# Patient Record
Sex: FEMALE | Race: WHITE | HISPANIC OR LATINO | URBAN - METROPOLITAN AREA
[De-identification: names, ages, dates, MRNs, and addresses within clinical notes are randomized per-mention and may not be internally consistent; named-entity substitution may affect disease eponyms.]

---

## 2017-11-02 PROBLEM — S42.231A CLOSED 3-PART FRACTURE OF SURGICAL NECK OF RIGHT HUMERUS: Status: ACTIVE | Noted: 2017-11-02

## 2017-11-02 PROBLEM — D72.829 LEUKOCYTOSIS: Status: ACTIVE | Noted: 2017-11-02

## 2017-11-02 PROBLEM — Z79.891 CHRONIC PRESCRIPTION OPIATE USE: Status: ACTIVE | Noted: 2017-11-02

## 2017-11-02 PROBLEM — M81.0 OSTEOPOROSIS: Status: ACTIVE | Noted: 2017-11-02

## 2017-11-02 PROBLEM — M19.90 OSTEOARTHRITIS: Status: ACTIVE | Noted: 2017-11-02

## 2017-11-02 PROBLEM — G89.4 CHRONIC PAIN SYNDROME: Status: ACTIVE | Noted: 2017-11-02

## 2017-11-02 PROBLEM — E87.1 HYPONATREMIA: Status: ACTIVE | Noted: 2017-11-02

## 2017-11-10 DIAGNOSIS — Z98.890 S/P ORIF (OPEN REDUCTION INTERNAL FIXATION) FRACTURE: Primary | ICD-10-CM

## 2017-11-10 DIAGNOSIS — Z87.81 S/P ORIF (OPEN REDUCTION INTERNAL FIXATION) FRACTURE: Primary | ICD-10-CM

## 2017-11-13 ENCOUNTER — HOSPITAL ENCOUNTER (OUTPATIENT)
Dept: RADIOLOGY | Facility: HOSPITAL | Age: 62
Discharge: HOME | End: 2017-11-13
Attending: ORTHOPAEDIC SURGERY
Payer: MEDICAID

## 2017-11-13 ENCOUNTER — OFFICE VISIT (OUTPATIENT)
Dept: ORTHOPEDICS | Facility: CLINIC | Age: 62
End: 2017-11-13
Payer: MEDICAID

## 2017-11-13 VITALS — BODY MASS INDEX: 22.84 KG/M2 | HEIGHT: 61 IN | WEIGHT: 121 LBS

## 2017-11-13 DIAGNOSIS — Z87.81 S/P ORIF (OPEN REDUCTION INTERNAL FIXATION) FRACTURE: ICD-10-CM

## 2017-11-13 DIAGNOSIS — Z98.890 S/P ORIF (OPEN REDUCTION INTERNAL FIXATION) FRACTURE: ICD-10-CM

## 2017-11-13 DIAGNOSIS — Z98.890 S/P ORIF (OPEN REDUCTION INTERNAL FIXATION) FRACTURE: Primary | ICD-10-CM

## 2017-11-13 DIAGNOSIS — S42.292D CLOSED 3-PART FRACTURE OF PROXIMAL HUMERUS, LEFT, WITH ROUTINE HEALING, SUBSEQUENT ENCOUNTER: ICD-10-CM

## 2017-11-13 DIAGNOSIS — Z87.81 S/P ORIF (OPEN REDUCTION INTERNAL FIXATION) FRACTURE: Primary | ICD-10-CM

## 2017-11-13 PROCEDURE — 99999 PR PBB SHADOW E&M-EST. PATIENT-LVL III: CPT | Mod: PBBFAC,,, | Performed by: ORTHOPAEDIC SURGERY

## 2017-11-13 PROCEDURE — 99213 OFFICE O/P EST LOW 20 MIN: CPT | Mod: PBBFAC,25,PO | Performed by: ORTHOPAEDIC SURGERY

## 2017-11-13 PROCEDURE — 73030 X-RAY EXAM OF SHOULDER: CPT | Mod: TC,PO,RT

## 2017-11-13 PROCEDURE — 99024 POSTOP FOLLOW-UP VISIT: CPT | Mod: ,,, | Performed by: ORTHOPAEDIC SURGERY

## 2017-11-13 PROCEDURE — 73030 X-RAY EXAM OF SHOULDER: CPT | Mod: 26,RT,, | Performed by: RADIOLOGY

## 2017-11-14 NOTE — PROGRESS NOTES
"DATE: 11/13/2017  PATIENT: Shari Carpenter    Attending Physician: Gregory Villaseñor M.D.    HISTORY:  Shari Carpenter is a 62 y.o. female who returns for follow up evaluation of  her right proximal humerus fracture.  She underwent ORIF, 11/3/17.  Reports that she is weaning out of the sling.  Pain is reported at 4/10.  She denies any fevers or chills.    PMH/PSH/FamHx/SocHx:  Reviewed and unchanged from prior visit    ROS:  Constitution: Negative for chills, fever, and sweats. Negative for unexplained weight loss.  HENT: Negative for headaches and blurry vision.   Cardiovascular: Negative for chest pain, irregular heartbeat, leg swelling and palpitations.   Respiratory: Negative for cough and shortness of breath.   Gastrointestinal: Negative for abdominal pain, heartburn, nausea and vomiting.   Genitourinary: Negative for bladder incontinence and dysuria.   Musculoskeletal: Negative for systemic arthritis, joint swelling, muscle weakness and myalgias.   Neurological: Negative for numbness.   Psychiatric/Behavioral: Negative for depression.   Endocrine: Negative for polyuria.   Hematologic/Lymphatic: Negative for bleeding disorders.  Skin: Negative for poor wound healing.       EXAM:  Ht 5' 1" (1.549 m)   Wt 54.9 kg (121 lb)   LMP  (Exact Date)   BMI 22.86 kg/m²   Healthy-appearing female no acute distress.  Examination of the right shoulder reveals the incision is clean and dry.  Sensation is intact the lateral upper arm and lateral forearm.  range of motion is 70° of forward elevation and 60° of abduction.  Motor strength not tested.  Sensory motor exam intact to the radial, ulnar, median nerve distribution of the hand    IMAGING:   X-rays the right shoulder performed and personally reviewed and compared to the radiologist's report.  Status post ORIF right proximal humerus fracture.  Fracture alignment is satisfactory.  Hardware is in acceptable position.    ASSESSMENT:  Status post ORIF right proximal humerus " fracture, 11/3/17    PLAN:  The implications of the patient's evolution of symptoms and findings were explained to the patient and her family in detail.  And it is doing well postoperatively.  Sutures are removed today.  We'll start formal therapy for range of motion and gentle strengthening.  Follow-up in 4 weeks' time for reexam and follow-up x-rays of the right shoulder to include AP in internal and external rotation views.          This note was dictated using voice recognition software. Please excuse any grammatical or typographical errors.

## 2017-11-22 ENCOUNTER — CLINICAL SUPPORT (OUTPATIENT)
Dept: REHABILITATION | Facility: HOSPITAL | Age: 62
End: 2017-11-22
Payer: MEDICAID

## 2017-11-22 DIAGNOSIS — M25.60 STIFFNESS IN JOINT: ICD-10-CM

## 2017-11-22 DIAGNOSIS — M79.601 PAIN OF RIGHT UPPER EXTREMITY: ICD-10-CM

## 2017-11-22 DIAGNOSIS — R53.1 WEAKNESS: ICD-10-CM

## 2017-11-22 PROCEDURE — 97165 OT EVAL LOW COMPLEX 30 MIN: CPT | Mod: PN

## 2017-11-22 NOTE — PLAN OF CARE
TIME RECORD    Date: 11/22/2017    Start Time:  100  Stop Time:  145    PROCEDURES:    TIMED  Procedure Min.   TE -             UNTIMED  Procedure Min.   IE 45         Total Timed Minutes:  -  Total Timed Units:  -  Total Untimed Units:  1  Charges Billed/# of units:  LCE1    Visit:1 FOTO @ 5    OCCUPATIONAL THERAPY INITIAL EVALUATION & PLAN OF TREATMENT    Patient Name: Shari Carpenter  Physician Name:  Gregory Villaseñor  Primary Diagnosis:  R 3 part fracture s/p ORIF  Treatment Diagnosis:  Pain in limb, weakness, stiffness in joint  Onset Date:  DOS:11/03/17  Eval Date:  11/22/2017  Certification Period:  11/22/2017 to 1/22/18  Past Medical History:   Past Medical History:   Diagnosis Date    Migraine headache      Precautions:  Per protocol, NWB  Prior Therapy:  None for shoulder  Signs of Abuse: no  Medications: Shari Carpenter has a current medication list which includes the following prescription(s): oxycodone-acetaminophen and sumatriptan.  Nutrition:  WDWNF  Social Cultural Assessment:  Doesn't work  Prior Level of Function: Independent  Social History:  Lives alone, drives  Place of Residence (steps/adaptations):  N/A  Functional Deficits Leading to Referral/Nature of Injury:  Was tackled by sister's dog resulting in fracture requiring surgery. Presents today without sling. Pertinent findings are decreased ROM, weakness and pain all impacting pt's functional independence.   Patient Therapy Goals:  To decrease  Pain and increase functional use RUE  Hand dominance: Right  X-Rays/Tests: see operative report    Subjective:  Pain:  During no work: 6/10  While working: 10/10  Sleeping: 10/10  Location of pain: lateral shoulder    Objective:  Sensation Test: Patient denies any numbness/tingling    Observation/Inspection:rounded shoulders    Range of Motion:   Shoulder  Right   Left  Pain/Dysfunction with Movement    AROM PROM MMT AROM PROM MMT    flexion 120 145 N/T WNL WNL WNL    extension 40 WNL N/T WNL WNL WNL     abduction 105 130 N/T WNL WNL WNL    adduction 0 N/T N/T WNL WNL WNL    Internal rotation L4 50 N/T WNL WNL WNL    ER at 90° abd 45 35 N/T WNL WNL WNL    ER at 0° abd 50 45 N/T WNL WNL WNL      ROM Comments: Firm end feel, Pain at end range    Painful Arc: Patient demonstrates no painful arc in shoulder flexion or abduction    Special Tests:deferred    Palpation: (for pain)     Positive: biceps, upper traps, incision-dense scar tissue, hypersensitive    Limitations of Functional Status:   Self Care: requires increase time to don clothes, inability or pain with donning bra and reaching overhead, brushing hair  Work: N/A  Leisure: cooking, cleaning, mopping, sweeping, carrying, lifting, reaching    Test: Patient scored 59% on FOTO shoulder survey demonstrating Pt's functional ability with upper extremity.     Treatment included: OT evaluation, the following exercises (HEP) were instructed and Shari CARLSON was able to demonstrate them prior to the end of the session. HEP are as follows: see attached in media     Assessment  This 62 y.o. female referred to Outpatient Occupational Therapy with diagnosis of R 3 part fracture s/p ORIF presents with limitations as described in problem list. Patient can benefit from Occupational Therapy services for Ultrasound, moist heat, PROM, AAROM, AROM, Theraputic exercises, joint mobs, home exercise program provied with written instructions, ice and strengthening. The following goals were discussed with the patient and she is in agreement with them as to be addressed in the treatment plan.     History Examination Decision Making Complexity Score   Occupational Profile: Did an expanded chart review        Medical and Therapy History:     Comorbidities that may affect POC include: none noted          Low   Performance Deficits   Dominant UE affected    Physical  Decreased function of Right UE, Decreased ROM, Increased pain, Decreased strength, Hypomobility, Muscular atrophy, Inability to  perform work/tasks, Difficulty sleeping, Inability to perform leisure activiites and Inability to perform self care tasks    Cognitive  N/A      Psychosocial:    Pt unable to perform the following requires increase time to don clothes, inability or pain with donning bra and reaching overhead, brushing hair, cooking, cleaning, mopping, sweeping, carrying, lifting, reaching all of which were a part of pt's habits, roles, routines, interpersonal interactions prior to injury/surgey     Moderate Foto score:59%    Pt has several treatment options including ASTYM, IASTM, cupping, soft tissue mobs, joint mobs, therex, therapeutic activity, education, endurance training, modalities etc.      Discussed goals and Pt in agreement with goals.    Issued HEP at eval and pt able to perform all with minimal verbal and tactile cues provided by OT. Pt able to complete all without c/o and demonstrating good technique    Low Low     Rehab Potential: good    Goals to be met in 4 weeks: (12/22/17)  1) Initiate Hep   2) Pt will increase R shoulder PROM by 10 degrees grossly for improved performance with overhead ADL's  3) Pt will report 6/10 pain in (R)shoulder at worst  4) Pt will progress to AROM/strengthening therex  5) Patient will be able to achieve less than or equal to 35% on FOTO shoulder survey demonstrating overall improved functional ability with upper extremity.     Goals to be met by discharge:  1) Independent with HEP  2) Pt will demonstrate (R) shoulder AROM WFL grossly for [place] with ADL's  3) Pt will demonstrate (R) shoulder MMT WFL grossly for [place] with functional activities  4) Independent and pain free with ADL's and IADL's  5) Patient will be able to achieve less than or equal to 15% on FOTO shoulder survey demonstrating overall improved functional ability with upper extremity.     Plan  Recommended Treatment Plan (2 times per week for 8 weeks): Therapeutic Exercise, Functional Activities, Patient  Education, Home Exercise Program, Ultrasound/Phonophoresis, Edema Control, Electrical Stimulation/TENS/Interferential, Moist Heat/Ice, Sensory/Neuromuscular Reeducation and Manual Therapy  Other Recommendations:  FEMI/ASTYM PRN    Therapist's Name: Maritza Bryanna   Date: 11/22/2017    I CERTIFY THE NEED FOR THESE SERVICES FURNISHED UNDER THIS PLAN OF TREATMENT AND WHILE UNDER MY CARE    Physician's comments: ________________________________________________________________________________________________________________________________________________      Physician's Name: ___________________________________

## 2017-11-29 ENCOUNTER — CLINICAL SUPPORT (OUTPATIENT)
Dept: REHABILITATION | Facility: HOSPITAL | Age: 62
End: 2017-11-29
Payer: MEDICAID

## 2017-11-29 DIAGNOSIS — R53.1 WEAKNESS: ICD-10-CM

## 2017-11-29 DIAGNOSIS — M79.601 PAIN OF RIGHT UPPER EXTREMITY: ICD-10-CM

## 2017-11-29 DIAGNOSIS — M25.60 STIFFNESS IN JOINT: ICD-10-CM

## 2017-11-29 PROCEDURE — 97530 THERAPEUTIC ACTIVITIES: CPT | Mod: PN

## 2017-11-29 NOTE — PROGRESS NOTES
"TIME RECORD    Date:  11/29/2017    Start Time:  1210  Stop Time:  100    PROCEDURES:    TIMED  Procedure Min.   MT 15   TE 10   TE Supervised 15 NC             UNTIMED  Procedure Min.   MHP 10 NC         Total Timed Minutes:  25  Total Timed Units:  2  Total Untimed Units: 0  Charges Billed/# of units:  2  TAx2    Progress/Current Status    Subjective:     Patient ID: Shari Carpenter is a 62 y.o. female.  Diagnosis:   1. Pain of right upper extremity     2. Weakness     3. Stiffness in joint     Physician Name:  Gregory Villaseñor  Primary Diagnosis:  R 3 part fracture s/p ORIF  Treatment Diagnosis:  Pain in limb, weakness, stiffness in joint  Onset Date:  DOS:11/03/17  Eval Date:  11/22/2017  Certification Period:  11/22/2017 to 1/22/18  Precautions: NWB      Pain: 5 /10  "Sometimes it hurts all the way into my neck."    Objective:     Pt seen by OT for R 3 part fx, s/p ORIF 3 weeks, 5 days p/o with tx as follows:  MHP x 10 min to R shld for pain mgmt and tissue extensibility.  MT x 15 min consisting of patient supine for R shoulder lateral telescoping, UT STM/TPR, STM to upper arm, pec lift, subscapularis MFR and stretch, gentle PROM to end range, GHJ inferior anterior posterior glides grade I-III, gentle shoulder oscillations and scar mgmt.   Pt participates in OT therex for ROM R shoulder per tx log adhering to post op protocol.   Exercises Date 11/29/17     Visit #2   PROM (R) Shoulder FLEX/ABD/IR/ER 10x   Isometrics f/abd/add/ext/er/ir 10 reps ea with 5 second hold   Upper traps stretch 3/30" hold   scap retraction 30x   Shoulder shrugs 30x   Scap prot/retr 30x   pendulums 30x each way            Declined CP at end of session.    Assessment:     Pt with good participation, motivated to work with therapy.  She tolerated MT and PROM well with moderate tightness/tenderness throughout upper arm, upper traps and shoulder girdle. Minimal pain at end ranges.  She has moderately dense scar tissue at scar site as well.  " She reports compliance with HEP and scar mgmt.  Pt would benefit from continued OT to decrease pain, increase ROM, strength and functional use of RUE, progressing as able.     Patient Education/Response:     Cont HEP    Plans and Goals:     Cont OT 2 times a week for 8 weeks.     Goals to be met in 4 weeks: (12/22/17)  1) Initiate Hep   2) Pt will increase R shoulder PROM by 10 degrees grossly for improved performance with overhead ADL's  3) Pt will report 6/10 pain in (R)shoulder at worst  4) Pt will progress to AROM/strengthening therex  5) Patient will be able to achieve less than or equal to 35% on FOTO shoulder survey demonstrating overall improved functional ability with upper extremity.      Goals to be met by discharge:  1) Independent with HEP  2) Pt will demonstrate (R) shoulder AROM WFL grossly for [place] with ADL's  3) Pt will demonstrate (R) shoulder MMT WFL grossly for [place] with functional activities  4) Independent and pain free with ADL's and IADL's  5) Patient will be able to achieve less than or equal to 15% on FOTO shoulder survey demonstrating overall improved functional ability with upper extremity.

## 2017-12-06 ENCOUNTER — CLINICAL SUPPORT (OUTPATIENT)
Dept: REHABILITATION | Facility: HOSPITAL | Age: 62
End: 2017-12-06
Payer: MEDICAID

## 2017-12-06 DIAGNOSIS — R53.1 WEAKNESS: ICD-10-CM

## 2017-12-06 DIAGNOSIS — M25.60 STIFFNESS IN JOINT: ICD-10-CM

## 2017-12-06 DIAGNOSIS — M79.601 PAIN OF RIGHT UPPER EXTREMITY: ICD-10-CM

## 2017-12-06 PROCEDURE — 97530 THERAPEUTIC ACTIVITIES: CPT | Mod: PN

## 2017-12-06 NOTE — PROGRESS NOTES
"TIME RECORD    Date:  12/06/2017    Start Time:  300  Stop Time:  350    PROCEDURES:    TIMED  Procedure Min.   MT 15   TE 25                 UNTIMED  Procedure Min.   MHP 10 NC         Total Timed Minutes:  40  Total Timed Units:  3  Total Untimed Units: 0  Charges Billed/# of units:  3  TAx3    Progress/Current Status    Subjective:     Patient ID: Shari Carpenter is a 62 y.o. female.  Diagnosis:   1. Pain of right upper extremity     2. Weakness     3. Stiffness in joint     Physician Name:  Gregory Villaseñor  Primary Diagnosis:  R 3 part fracture s/p ORIF  Treatment Diagnosis:  Pain in limb, weakness, stiffness in joint  Onset Date:  DOS:11/03/17  Eval Date:  11/22/2017  Certification Period:  11/22/2017 to 1/22/18  Precautions: NWB    Pain: 6-7/10  "I go see the doctor next Monday. I slept on my shoulder wrong and it's been hurting since then."    Objective:     Pt seen by OT for R 3 part fx, s/p ORIF 4 weeks, 5 days p/o with tx as follows:  MHP x 10 min to R shld for pain mgmt and tissue extensibility.  MT x 15 min consisting of patient supine for R shoulder lateral telescoping, UT STM/TPR, STM to upper arm, pec lift, subscapularis MFR and stretch, gentle PROM to end range, GHJ inferior anterior posterior glides grade I-III, gentle shoulder oscillations and scar mgmt.   Pt participates in OT therex for ROM R shoulder per tx log adhering to post op protocol.   Exercises Date 11/29/17     Visit #3   PROM (R) Shoulder FLEX/ABD/IR/ER 10x   Isometrics f/abd/add/ext/er/ir 10 reps ea with 5 second hold   Upper traps stretch 3/30" hold   scap retraction 30x   Shoulder shrugs 30x   Scap prot/retr 30x   pendulums 30x each way   Supine dowel shoulder flex to 90 2/10   -ER 2/10   pulleys Facing wall 3min   Table slides 3min      Declined CP at end of session.    Assessment:     Pt with increased pain this date. Reinforced pain free with all HEP and activity. Also reinforced no lifting. Pt verbalized understanding. She " tolerated MT and PROM well with moderate tightness/tenderness throughout bicep, upper traps and noted dense scar tissue at incsion. Minimal pain at end ranges. ROM seems to be steadily improving. Pt would benefit from continued OT to decrease pain, increase ROM, strength and functional use of RUE, progressing as able.     Patient Education/Response:     Cont HEP    Plans and Goals:     Cont OT 2 times a week for 8 weeks.     Goals to be met in 4 weeks: (12/22/17)  1) Initiate Hep   2) Pt will increase R shoulder PROM by 10 degrees grossly for improved performance with overhead ADL's  3) Pt will report 6/10 pain in (R)shoulder at worst  4) Pt will progress to AROM/strengthening therex  5) Patient will be able to achieve less than or equal to 35% on FOTO shoulder survey demonstrating overall improved functional ability with upper extremity.      Goals to be met by discharge:  1) Independent with HEP  2) Pt will demonstrate (R) shoulder AROM WFL grossly for [place] with ADL's  3) Pt will demonstrate (R) shoulder MMT WFL grossly for [place] with functional activities  4) Independent and pain free with ADL's and IADL's  5) Patient will be able to achieve less than or equal to 15% on FOTO shoulder survey demonstrating overall improved functional ability with upper extremity.

## 2017-12-11 ENCOUNTER — HOSPITAL ENCOUNTER (OUTPATIENT)
Dept: RADIOLOGY | Facility: HOSPITAL | Age: 62
Discharge: HOME | End: 2017-12-11
Attending: ORTHOPAEDIC SURGERY
Payer: MEDICAID

## 2017-12-11 ENCOUNTER — OFFICE VISIT (OUTPATIENT)
Dept: ORTHOPEDICS | Facility: CLINIC | Age: 62
End: 2017-12-11
Payer: MEDICAID

## 2017-12-11 VITALS — HEIGHT: 61 IN | BODY MASS INDEX: 22.84 KG/M2 | WEIGHT: 121 LBS

## 2017-12-11 DIAGNOSIS — Z98.890 S/P ORIF (OPEN REDUCTION INTERNAL FIXATION) FRACTURE: Primary | ICD-10-CM

## 2017-12-11 DIAGNOSIS — Z98.890 S/P ORIF (OPEN REDUCTION INTERNAL FIXATION) FRACTURE: ICD-10-CM

## 2017-12-11 DIAGNOSIS — Z87.81 S/P ORIF (OPEN REDUCTION INTERNAL FIXATION) FRACTURE: Primary | ICD-10-CM

## 2017-12-11 DIAGNOSIS — S42.292D CLOSED 3-PART FRACTURE OF PROXIMAL HUMERUS, LEFT, WITH ROUTINE HEALING, SUBSEQUENT ENCOUNTER: ICD-10-CM

## 2017-12-11 DIAGNOSIS — Z87.81 S/P ORIF (OPEN REDUCTION INTERNAL FIXATION) FRACTURE: ICD-10-CM

## 2017-12-11 PROCEDURE — 99999 PR PBB SHADOW E&M-EST. PATIENT-LVL III: CPT | Mod: PBBFAC,,, | Performed by: ORTHOPAEDIC SURGERY

## 2017-12-11 PROCEDURE — 73030 X-RAY EXAM OF SHOULDER: CPT | Mod: TC,PO,RT

## 2017-12-11 PROCEDURE — 99213 OFFICE O/P EST LOW 20 MIN: CPT | Mod: PBBFAC,25,PO | Performed by: ORTHOPAEDIC SURGERY

## 2017-12-11 PROCEDURE — 73030 X-RAY EXAM OF SHOULDER: CPT | Mod: 26,RT,, | Performed by: RADIOLOGY

## 2017-12-11 PROCEDURE — 99024 POSTOP FOLLOW-UP VISIT: CPT | Mod: ,,, | Performed by: ORTHOPAEDIC SURGERY

## 2017-12-11 NOTE — PROGRESS NOTES
"DATE: 12/11/2017  PATIENT: Shari Carpenter    Attending Physician: Gregory Villaseñor M.D.    HISTORY:  Shari Carpenter is a 62 y.o. female who returns for follow up evaluation of  her right shoulder.  She is status post ORIF right proximal humerus fracture, 11/3/17.  she reports that she is doing well until partially 1 week ago while she turned while sleeping and aggravated her shoulder.  She denied a boo pop or injury.  Pain is now 7/10.  She has been going to therapy regularly.     PMH/PSH/FamHx/SocHx:  Reviewed and unchanged from prior visit    ROS:  Constitution: Negative for chills, fever, and sweats. Negative for unexplained weight loss.  HENT: Negative for headaches and blurry vision.   Cardiovascular: Negative for chest pain, irregular heartbeat, leg swelling and palpitations.   Respiratory: Negative for cough and shortness of breath.   Gastrointestinal: Negative for abdominal pain, heartburn, nausea and vomiting.   Genitourinary: Negative for bladder incontinence and dysuria.   Musculoskeletal: Negative for systemic arthritis, joint swelling, muscle weakness and myalgias.   Neurological: Negative for numbness.   Psychiatric/Behavioral: Negative for depression.   Endocrine: Negative for polyuria.   Hematologic/Lymphatic: Negative for bleeding disorders.  Skin: Negative for poor wound healing.       EXAM:  Ht 5' 1" (1.549 m)   Wt 54.9 kg (121 lb)   LMP  (Exact Date)   BMI 22.86 kg/m² middle-age female in no acute distress.  Incision is healing nicely.  Sensation intact the lateral upper arm lateral forearm.  Passive range of motion is 120° of forward elevation, 80° of abduction.  Motor strength not tested today.     IMAGING:  X-rays of the right shoulder are personally reviewed.  Status post ORIF.  There does appear to be a small bony fragment off the greater tuberosity which is slightly elevated when compared to the immediate postoperative period however hardware position otherwise remains acceptable.  " Interval healing noted.      ASSESSMENT:  Status post ORIF right proximal humerus fracture, 11/3/17    PLAN:  The implications of the patient's evolution of symptoms and findings were explained to the patient in detail.  I have explained that although the x-rays show minimal change in the small greater tuberosity fragment, alignment is otherwise acceptable.  I recommended continued therapy to work on aggressive range of motion.  I like to see her back in 3 weeks' time for reexam and follow-up x-rays of the right shoulder.    This note was dictated using voice recognition software. Please excuse any grammatical or typographical errors.

## 2017-12-13 ENCOUNTER — CLINICAL SUPPORT (OUTPATIENT)
Dept: REHABILITATION | Facility: HOSPITAL | Age: 62
End: 2017-12-13
Payer: MEDICAID

## 2017-12-13 DIAGNOSIS — M25.60 STIFFNESS IN JOINT: ICD-10-CM

## 2017-12-13 DIAGNOSIS — R53.1 WEAKNESS: ICD-10-CM

## 2017-12-13 DIAGNOSIS — M79.601 PAIN OF RIGHT UPPER EXTREMITY: ICD-10-CM

## 2017-12-13 PROCEDURE — 97530 THERAPEUTIC ACTIVITIES: CPT | Mod: PN

## 2017-12-13 NOTE — PROGRESS NOTES
"TIME RECORD    Date:  12/13/2017    Start Time: 100  Stop Time: 150    PROCEDURES:    TIMED  Procedure Min.   MT 10   TE 30                 UNTIMED  Procedure Min.   MHP 10 NC         Total Timed Minutes:  40  Total Timed Units:  3  Total Untimed Units: 0  Charges Billed/# of units:  3  TAx3    Progress/Current Status    Subjective:     Physician Name:  Gregory Villaseñor  Primary Diagnosis:  R 3 part fracture s/p ORIF  Treatment Diagnosis:  Pain in limb, weakness, stiffness in joint  Onset Date:  DOS:11/03/17  Eval Date:  11/22/2017  Certification Period:  11/22/2017 to 1/22/18  Precautions: NWB    Pain: 6/10  "the doctor said I may have a small fragment loose."    Objective:     Pt seen by OT for R 3 part fx, s/p ORIF 5 weeks, 5 days p/o with tx as follows:  MHP x 10 min to R shld for pain mgmt and tissue extensibility.  MT x 15 min consisting of patient supine for R shoulder lateral telescoping, UT STM/TPR, STM to upper arm, pec lift, subscapularis MFR and stretch, gentle PROM to end range, GHJ inferior anterior posterior glides grade I-III, gentle shoulder oscillations and scar mgmt.   Pt participates in OT therex for ROM R shoulder per tx log adhering to post op protocol.   Exercises Date 12/13/2017     Visit #4   PROM (R) Shoulder FLEX/ABD/IR/ER 10x   Isometrics f/abd/add/ext/er/ir 10 reps ea with 5 second hold   Upper traps stretch 3/30" hold   scap retraction 30x   Shoulder shrugs 30x   Scap prot/retr 30x   pendulums 30x each way   Supine dowel shoulder flex to 90/chest press 2/15   -ER 2/10   S/L ABD/ER 2/10   pulleys Facing wall 3min   Table slides -      Range of Motion:   Shoulder   Right     AROM PROM   flexion 80(-30) 135(-10)   extension 50(+10) WNL   abduction 95(-10) 130(=)   adduction 0 N/T   Internal rotation S2(-L4) 80(+30)   ER at 90° abd 70(+25) 45(+10)   ER at 0° abd 40(-10) 60(+15)      Declined CP at end of session.    Assessment:     Pt's pain about the same today. Some motions improved from " initial evaluation, some motions have gotten worse since eval. Xray reveal small bone fragment moved however alignment remains good. Reinforced pain free with all HEP and activity. Also reinforced no lifting. Pt verbalized understanding. She tolerated MT and PROM well with moderate tightness/tenderness throughout bicep, upper traps and noted dense scar tissue at incsion. Reports compliance with STM  And scar massage at home. Minimal pain at end ranges. Pt would benefit from continued OT to decrease pain, increase ROM, strength and functional use of RUE, progressing as able.     Patient Education/Response:     Cont HEP    Plans and Goals:     Cont OT 2 times a week for 8 weeks.     Goals to be met in 4 weeks: (12/22/17)  1) Initiate Hep   2) Pt will increase R shoulder PROM by 10 degrees grossly for improved performance with overhead ADL's  3) Pt will report 6/10 pain in (R)shoulder at worst  4) Pt will progress to AROM/strengthening therex  5) Patient will be able to achieve less than or equal to 35% on FOTO shoulder survey demonstrating overall improved functional ability with upper extremity.      Goals to be met by discharge:  1) Independent with HEP  2) Pt will demonstrate (R) shoulder AROM WFL grossly for [place] with ADL's  3) Pt will demonstrate (R) shoulder MMT WFL grossly for [place] with functional activities  4) Independent and pain free with ADL's and IADL's  5) Patient will be able to achieve less than or equal to 15% on FOTO shoulder survey demonstrating overall improved functional ability with upper extremity.

## 2017-12-20 ENCOUNTER — CLINICAL SUPPORT (OUTPATIENT)
Dept: REHABILITATION | Facility: HOSPITAL | Age: 62
End: 2017-12-20
Payer: MEDICAID

## 2017-12-20 DIAGNOSIS — R53.1 WEAKNESS: ICD-10-CM

## 2017-12-20 DIAGNOSIS — M25.60 STIFFNESS IN JOINT: ICD-10-CM

## 2017-12-20 DIAGNOSIS — M79.601 PAIN OF RIGHT UPPER EXTREMITY: ICD-10-CM

## 2017-12-20 PROCEDURE — 97530 THERAPEUTIC ACTIVITIES: CPT | Mod: PN

## 2017-12-20 NOTE — PROGRESS NOTES
"TIME RECORD    Date:  12/20/2017    Start Time: 1245  Stop Time: 140    PROCEDURES:    TIMED  Procedure Min.   MT 10   TE 35                 UNTIMED  Procedure Min.   MHP 10 NC         Total Timed Minutes:  45  Total Timed Units:  3  Total Untimed Units: 0  Charges Billed/# of units:  3  TAx3    Progress/Current Status    Subjective:     Physician Name:  Gregory Villaseñor  Primary Diagnosis:  R 3 part fracture s/p ORIF  Treatment Diagnosis:  Pain in limb, weakness, stiffness in joint  Onset Date:  DOS:11/03/17  Eval Date:  11/22/2017  Certification Period:  11/22/2017 to 1/22/18  Precautions: NWB    Pain: 5/10; 2/10 after cupping  "I'm having pain in the back of my shoulder." Pt reports pain improved after cupping today.    Objective:     Pt seen by OT for R 3 part fx, s/p ORIF 6 weeks, 5 days p/o with tx as follows:  MHP x 10 min to R shld for pain mgmt and tissue extensibility.  MT x 15 min consisting of patient supine for R shoulder lateral telescoping, UT STM/TPR, STM to upper arm, pec lift, subscapularis MFR and stretch, gentle PROM to end range, GHJ inferior anterior posterior glides grade I-III, gentle shoulder oscillations and scar mgmt along with cupping with mobilization.   Pt participates in OT therex for ROM R shoulder per tx log adhering to post op protocol.   Exercises Date 12/20/2017     Visit #5   PROM (R) Shoulder FLEX/ABD/IR/ER 10x   Isometrics f/abd/add/ext/er/ir 10 reps ea with 5 second hold   Upper traps stretch 3/30" hold   scap retraction 30x   Shoulder shrugs 30x   Scap prot/retr 30x   pendulums 30x each way   Supine dowel shoulder flex to 90/chest press 2/15   -ER 2/10   S/L ABD/ER 2/10   pulleys Facing wall 3min   wall slides 10x   Tband Lats/Rows Red  2/15      Range of Motion:   Shoulder Right     PROM   flexion 140(+5)   extension WNL   abduction 110(-20)   adduction N/T   Internal rotation 90(+10)   ER at 90° abd 50(+10)   ER at 0° abd 70(+10)   FOTO:59%  Declined CP at end of " session.    Assessment:     Pt's pain improved after cupping today. Slight improvement with PROM after cupping as well. No significant change noted with AROM today.  Pt required reinforcement with pain free ROM with therex and to slow down with therex. Pt continues to report compliance with STM  And scar massage at home. Minimal pain at end ranges. Pt may benefit for MRI next MD visit if no improvement by next appt. Pt still limited by weakness, pain and decreased ROM. Pt would benefit from continued OT to address these deficits.     Patient Education/Response:     Cont HEP    Plans and Goals:     Cont OT 2 times a week for 8 weeks.     Goals to be met in 4 weeks: (12/22/17)  1) Initiate Hep   2) Pt will increase R shoulder PROM by 10 degrees grossly for improved performance with overhead ADL's  3) Pt will report 6/10 pain in (R)shoulder at worst  4) Pt will progress to AROM/strengthening therex  5) Patient will be able to achieve less than or equal to 35% on FOTO shoulder survey demonstrating overall improved functional ability with upper extremity.      Goals to be met by discharge:  1) Independent with HEP  2) Pt will demonstrate (R) shoulder AROM WFL grossly for [place] with ADL's  3) Pt will demonstrate (R) shoulder MMT WFL grossly for [place] with functional activities  4) Independent and pain free with ADL's and IADL's  5) Patient will be able to achieve less than or equal to 15% on FOTO shoulder survey demonstrating overall improved functional ability with upper extremity.

## 2017-12-27 ENCOUNTER — CLINICAL SUPPORT (OUTPATIENT)
Dept: REHABILITATION | Facility: HOSPITAL | Age: 62
End: 2017-12-27
Payer: MEDICAID

## 2017-12-27 DIAGNOSIS — M79.601 PAIN OF RIGHT UPPER EXTREMITY: ICD-10-CM

## 2017-12-27 DIAGNOSIS — R53.1 WEAKNESS: ICD-10-CM

## 2017-12-27 DIAGNOSIS — M25.60 STIFFNESS IN JOINT: ICD-10-CM

## 2017-12-27 PROCEDURE — 97530 THERAPEUTIC ACTIVITIES: CPT | Mod: PN

## 2017-12-27 NOTE — PROGRESS NOTES
"TIME RECORD    Date:  12/27/2017    Start Time: 100  Stop Time: 200    PROCEDURES:    TIMED  Procedure Min.   MT 10   TE 40                 UNTIMED  Procedure Min.   MHP 10 NC         Total Timed Minutes:  50  Total Timed Units:  4  Total Untimed Units: 0  Charges Billed/# of units:    TAx4    Progress/Current Status    Subjective:     Physician Name:  Gregory Villaseñor  Primary Diagnosis:  R 3 part fracture s/p ORIF  Treatment Diagnosis:  Pain in limb, weakness, stiffness in joint  Onset Date:  DOS:11/03/17  Eval Date:  11/22/2017  Certification Period:  11/22/2017 to 1/22/18  Precautions: NWB    Pain: 3/10  "I feel like I am moving my arm better."    Objective:     Pt seen by OT for R 3 part fx, s/p ORIF 7 weeks, 5 days p/o with tx as follows:  MHP x 10 min to R shld for pain mgmt and tissue extensibility.  MT x 15 min consisting of patient supine for R shoulder lateral telescoping, UT STM/TPR, STM to upper arm, pec lift, subscapularis MFR and stretch, gentle PROM to end range, GHJ inferior anterior posterior glides grade I-III, gentle shoulder oscillations and scar mgmt along ASTYM to bicep/incision  Pt participates in OT therex for ROM R shoulder per tx log adhering to post op protocol.   Exercises Date 12/27/2017     Visit #6   PROM (R) Shoulder FLEX/ABD/IR/ER 10x   Isometrics f/abd/add/ext/er/ir 10 reps ea with 5 second hold   Upper traps stretch 3/30" hold   scap retraction 30x   Shoulder shrugs 30x   Scap prot/retr 30x   pendulums 30x each way   Supine dowel shoulder flex to 90/chest press 2/15   -ER 2/10   S/L ABD/ER 2/10   pulleys Facing wall 3min   wall slides 10x   Tband Lats/Rows Red  2/15      Range of Motion:   Shoulder Right     PROM   flexion 140(+5)   extension WNL   abduction 120(+10)   adduction N/T   Internal rotation 90(+10)   ER at 90° abd 70(+20)   ER at 0° abd 75(+5)     Declined CP at end of session.    Assessment:     Signs and symptoms of fibrotic tissue noted during ASTYM however tolerated " well and with improved ROM noted after. Pt with good endurance and technique noted during therex. Pain report decreased today and pt reporting  increased functional use RUE with ADL's and IADL's. Pt still limited by weakness, pain and decreased ROM. Pt would benefit from continued OT to address these deficits.     Patient Education/Response:     Cont HEP    Plans and Goals:     Cont OT 2 times a week for 8 weeks.     Goals to be met in 4 weeks: (12/22/17)  1) Initiate Hep   2) Pt will increase R shoulder PROM by 10 degrees grossly for improved performance with overhead ADL's  3) Pt will report 6/10 pain in (R)shoulder at worst  4) Pt will progress to AROM/strengthening therex  5) Patient will be able to achieve less than or equal to 35% on FOTO shoulder survey demonstrating overall improved functional ability with upper extremity.      Goals to be met by discharge:  1) Independent with HEP  2) Pt will demonstrate (R) shoulder AROM WFL grossly for [place] with ADL's  3) Pt will demonstrate (R) shoulder MMT WFL grossly for [place] with functional activities  4) Independent and pain free with ADL's and IADL's  5) Patient will be able to achieve less than or equal to 15% on FOTO shoulder survey demonstrating overall improved functional ability with upper extremity.

## 2018-01-03 ENCOUNTER — CLINICAL SUPPORT (OUTPATIENT)
Dept: REHABILITATION | Facility: HOSPITAL | Age: 63
End: 2018-01-03
Payer: MEDICAID

## 2018-01-03 DIAGNOSIS — M25.511 ACUTE PAIN OF RIGHT SHOULDER: Primary | ICD-10-CM

## 2018-01-03 DIAGNOSIS — R53.1 WEAKNESS: ICD-10-CM

## 2018-01-03 DIAGNOSIS — M25.60 STIFFNESS IN JOINT: ICD-10-CM

## 2018-01-03 DIAGNOSIS — M79.601 PAIN OF RIGHT UPPER EXTREMITY: ICD-10-CM

## 2018-01-03 PROCEDURE — 97530 THERAPEUTIC ACTIVITIES: CPT | Mod: PN

## 2018-01-03 NOTE — PROGRESS NOTES
"TIME RECORD    Date:  01/03/2018    Start Time: 120  Stop Time: 210    PROCEDURES:    TIMED  Procedure Min.   MT 10    sup TE 30N/C                 UNTIMED  Procedure Min.   MHP 10 NC         Total Timed Minutes:  10  Total Timed Units:  1  Total Untimed Units: 0  Charges Billed/# of units:    TAx1    Progress/Current Status    Subjective:     Physician Name:  Gregory Villaseñor  Primary Diagnosis:  R 3 part fracture s/p ORIF  Treatment Diagnosis:  Pain in limb, weakness, stiffness in joint  Onset Date:  DOS:11/03/17  Eval Date:  11/22/2017  Certification Period:  11/22/2017 to 1/22/18  Precautions: NWB    Pain: 3/10  "I go see the doctor tomorrow."    Objective:     Pt seen by OT for R 3 part fx, s/p ORIF 8 weeks, 5 days p/o with tx as follows:  MHP x 10 min to R shld for pain mgmt and tissue extensibility.  MT x 10 min consisting of patient supine for R shoulder lateral telescoping, UT STM/TPR, STM to upper arm, pec lift, subscapularis MFR and stretch, gentle PROM to end range, GHJ inferior anterior posterior glides grade I-III, gentle shoulder oscillations and scar mgmt.   Pt participates in OT therex for ROM R shoulder per tx log adhering to post op protocol.   Exercises Date 01/03/2018     Visit #7   PROM (R) Shoulder FLEX/ABD/IR/ER 10x   Isometrics f/abd/add/ext/er/ir 10 reps ea with 5 second hold   Upper traps stretch 3/30" hold   scap retraction 30x   Shoulder shrugs 30x   Scap prot/retr 30x   pendulums 30x each way   Supine dowel shoulder flex to 90/chest press 2/15   -ER 2/10   S/L ABD/ER 2/10   pulleys Facing wall 3min   wall slides 10x   Tband Lats/Rows Red  2/15      Range of Motion:   Shoulder Right     PROM   flexion 150(+10)   extension WNL   abduction 130(+10)   adduction N/T   Internal rotation 90(=)   ER at 90° abd 70(=)   ER at 0° abd 75(=)     Declined CP at end of session.    Assessment:     Pt with improved passive flex and abd. No change noted IR/ER. Pt continues to have some fibrotic tissue " noted around incision however improved after ASTYM tx last session. Pt declined ASTYM again today. Good endurance noted with therex and pt able to complete all with without c/o. Pain report decreased today and pt reporting increased functional use RUE with ADL's and IADL's. Pt still limited by weakness, pain and decreased ROM. Pt would benefit from continued OT to address these deficits.     Patient Education/Response:     Cont HEP    Plans and Goals:     Cont OT 2 times a week for 8 weeks. Pt to follow up with doctor tomorrow. Plan to progress as indicated.     Goals to be met in 4 weeks: (12/22/17)  1) Initiate Hep   2) Pt will increase R shoulder PROM by 10 degrees grossly for improved performance with overhead ADL's  3) Pt will report 6/10 pain in (R)shoulder at worst  4) Pt will progress to AROM/strengthening therex  5) Patient will be able to achieve less than or equal to 35% on FOTO shoulder survey demonstrating overall improved functional ability with upper extremity.      Goals to be met by discharge:  1) Independent with HEP  2) Pt will demonstrate (R) shoulder AROM WFL grossly for [place] with ADL's  3) Pt will demonstrate (R) shoulder MMT WFL grossly for [place] with functional activities  4) Independent and pain free with ADL's and IADL's  5) Patient will be able to achieve less than or equal to 15% on FOTO shoulder survey demonstrating overall improved functional ability with upper extremity.

## 2018-01-04 ENCOUNTER — OFFICE VISIT (OUTPATIENT)
Dept: ORTHOPEDICS | Facility: CLINIC | Age: 63
End: 2018-01-04
Payer: MEDICAID

## 2018-01-04 ENCOUNTER — TELEPHONE (OUTPATIENT)
Dept: ORTHOPEDICS | Facility: CLINIC | Age: 63
End: 2018-01-04

## 2018-01-04 ENCOUNTER — HOSPITAL ENCOUNTER (OUTPATIENT)
Dept: RADIOLOGY | Facility: HOSPITAL | Age: 63
Discharge: HOME | End: 2018-01-04
Attending: ORTHOPAEDIC SURGERY
Payer: MEDICAID

## 2018-01-04 VITALS — WEIGHT: 121 LBS | HEIGHT: 61 IN | BODY MASS INDEX: 22.84 KG/M2

## 2018-01-04 DIAGNOSIS — M25.511 ACUTE PAIN OF RIGHT SHOULDER: ICD-10-CM

## 2018-01-04 DIAGNOSIS — Z98.890 S/P ORIF (OPEN REDUCTION INTERNAL FIXATION) FRACTURE: Primary | ICD-10-CM

## 2018-01-04 DIAGNOSIS — Z87.81 S/P ORIF (OPEN REDUCTION INTERNAL FIXATION) FRACTURE: Primary | ICD-10-CM

## 2018-01-04 DIAGNOSIS — S42.292D CLOSED 3-PART FRACTURE OF PROXIMAL HUMERUS, LEFT, WITH ROUTINE HEALING, SUBSEQUENT ENCOUNTER: ICD-10-CM

## 2018-01-04 PROCEDURE — 99024 POSTOP FOLLOW-UP VISIT: CPT | Mod: ,,, | Performed by: ORTHOPAEDIC SURGERY

## 2018-01-04 PROCEDURE — 99999 PR PBB SHADOW E&M-EST. PATIENT-LVL II: CPT | Mod: PBBFAC,,, | Performed by: ORTHOPAEDIC SURGERY

## 2018-01-04 PROCEDURE — 73030 X-RAY EXAM OF SHOULDER: CPT | Mod: TC,FY,PO,RT

## 2018-01-04 PROCEDURE — 73030 X-RAY EXAM OF SHOULDER: CPT | Mod: 26,FY,RT, | Performed by: RADIOLOGY

## 2018-01-04 PROCEDURE — 99212 OFFICE O/P EST SF 10 MIN: CPT | Mod: PBBFAC,25,PN | Performed by: ORTHOPAEDIC SURGERY

## 2018-01-04 NOTE — TELEPHONE ENCOUNTER
----- Message from Afia Pino sent at 1/4/2018  1:02 PM CST -----  Contact: self  Patient is running about 10 minutes late for appointment at 1:30 due to traffic. Please call patient at [phone]. Thanks!

## 2018-01-04 NOTE — PROGRESS NOTES
"DATE: 1/4/2018  PATIENT: Shari Carpenter    Attending Physician: Gregory Villaseñor M.D.    HISTORY:  Shari Carpenter is a 62 y.o. female who returns for follow up evaluation of  her right proximal humerus fracture.  She underwent ORIF 11/3/17.  He continues to go to their regularly and notes progress with motion.  Pain is reported at 6/10 today.    PMH/PSH/FamHx/SocHx:  Reviewed and unchanged from prior visit    ROS:  Constitution: Negative for chills, fever, and sweats. Negative for unexplained weight loss.  HENT: Negative for headaches and blurry vision.   Cardiovascular: Negative for chest pain, irregular heartbeat, leg swelling and palpitations.   Respiratory: Negative for cough and shortness of breath.   Gastrointestinal: Negative for abdominal pain, heartburn, nausea and vomiting.   Genitourinary: Negative for bladder incontinence and dysuria.   Musculoskeletal: Negative for systemic arthritis, joint swelling, muscle weakness and myalgias.   Neurological: Negative for numbness.   Psychiatric/Behavioral: Negative for depression.   Endocrine: Negative for polyuria.   Hematologic/Lymphatic: Negative for bleeding disorders.  Skin: Negative for poor wound healing.       EXAM:  Ht 5' 1" (1.549 m)   Wt 54.9 kg (121 lb)   BMI 22.86 kg/m²   Healthy-appearing female no acute distress.  Examination right shoulder reveals the incision is healed.  Passive range of motion is 120° of forward elevation, 100° of abduction, external rotation with the arm abducted 90° is 65°.  Motor strength is nearly 5/5 in the supraspinatus and 5 minus/5 in the external rotators.    IMAGING:   X-rays the right shoulder are performed.  Status post ORIF proximal humerus fracture hardware is in good position.  Humeral fracture is satisfactory.  There is a comminuted greater tuberosity with a small fragment slightly migrated superiorly.    ASSESSMENT:  Status post ORIF right proximal humerus fracture, 11/3/17    PLAN:  The implications of the " patient's evolution of symptoms and findings were explained to the patient in detail.  I have explained that and is doing satisfactorily.  I did show her the small fragment on x-ray in the subacromial space.  However I've encouraged her to work on motion first as she has fairly good abduction strength.  I like to see her back in 6 weeks' time for reexam.  Should she continue to have stiffness or significant weakness, consideration of arthroscopy and lysis of adhesion and removal of the bone fragment may be considered.          This note was dictated using voice recognition software. Please excuse any grammatical or typographical errors.

## 2018-01-10 ENCOUNTER — CLINICAL SUPPORT (OUTPATIENT)
Dept: REHABILITATION | Facility: HOSPITAL | Age: 63
End: 2018-01-10
Payer: MEDICAID

## 2018-01-10 DIAGNOSIS — M25.60 STIFFNESS IN JOINT: ICD-10-CM

## 2018-01-10 DIAGNOSIS — M79.601 PAIN OF RIGHT UPPER EXTREMITY: ICD-10-CM

## 2018-01-10 DIAGNOSIS — R53.1 WEAKNESS: ICD-10-CM

## 2018-01-10 PROCEDURE — 97530 THERAPEUTIC ACTIVITIES: CPT | Mod: PN

## 2018-01-10 NOTE — PROGRESS NOTES
"TIME RECORD    Date:  01/10/2018    Start Time: 100  Stop Time: 200    PROCEDURES:    TIMED  Procedure Min.   MT 10   TE 40                 UNTIMED  Procedure Min.   MHP 10 NC         Total Timed Minutes:  50  Total Timed Units:  4  Total Untimed Units: 0  Charges Billed/# of units:    TAx4    Progress/Current Status    Subjective:     Physician Name:  Gregory Villaseñor  Primary Diagnosis:  R 3 part fracture s/p ORIF  Treatment Diagnosis:  Pain in limb, weakness, stiffness in joint  Onset Date:  DOS:11/03/17  Eval Date:  11/22/2017  Certification Period:  11/22/2017 to 1/22/18  Precautions: NWB    Pain: 3/10  "The doctor said I might have a rotator cuff tear but he said the rest looks really good."    Objective:     Pt seen by OT for R 3 part fx, s/p ORIF 9  weeks, 5 days p/o with tx as follows:  MHP x 10 min to R shld for pain mgmt and tissue extensibility.  MT x 10 min consisting of patient supine for R shoulder lateral telescoping, UT STM/TPR, STM to upper arm, pec lift, subscapularis MFR and stretch, gentle PROM to end range, GHJ inferior anterior posterior glides grade I-III, gentle shoulder oscillations and scar mgmt.   Pt participates in OT therex for ROM R shoulder per tx log adhering to post op protocol.   Exercises Date 01/10/2018     Visit #8   PROM (R) Shoulder FLEX/ABD/IR/ER 10x   Isometrics f/abd/add/ext/er/ir 10 reps ea with 5 second hold   Upper traps stretch 3/30" hold   scap retraction 30x   Shoulder shrugs 30x   Scap prot/retr 30x   pendulums 30x each way   Supine dowel shoulder flex to 90/chest press 2/15   -ER 2/10   S/L ABD/ER 2/10   pulleys Facing wall 3min   wall slides 10x   Tband Lats/Rows Red  2/15      Declined CP at end of session.    Assessment:     Pt participated well this date. Good endurance noted with therex. Pt's pain continues to improve.  Continues with soft tissue tightness however responds well to manual therapy. Pt still limited by weakness, pain and decreased ROM. Pt would " benefit from continued OT to address these deficits.     Patient Education/Response:     Cont HEP    Plans and Goals:     Cont OT 2 times a week for 8 weeks.    Goals to be met in 4 weeks: (12/22/17)  1) Initiate Hep   2) Pt will increase R shoulder PROM by 10 degrees grossly for improved performance with overhead ADL's  3) Pt will report 6/10 pain in (R)shoulder at worst  4) Pt will progress to AROM/strengthening therex  5) Patient will be able to achieve less than or equal to 35% on FOTO shoulder survey demonstrating overall improved functional ability with upper extremity.      Goals to be met by discharge:  1) Independent with HEP  2) Pt will demonstrate (R) shoulder AROM WFL grossly for [place] with ADL's  3) Pt will demonstrate (R) shoulder MMT WFL grossly for [place] with functional activities  4) Independent and pain free with ADL's and IADL's  5) Patient will be able to achieve less than or equal to 15% on FOTO shoulder survey demonstrating overall improved functional ability with upper extremity.

## 2018-01-19 ENCOUNTER — CLINICAL SUPPORT (OUTPATIENT)
Dept: REHABILITATION | Facility: HOSPITAL | Age: 63
End: 2018-01-19
Payer: MEDICAID

## 2018-01-19 DIAGNOSIS — M79.601 PAIN OF RIGHT UPPER EXTREMITY: ICD-10-CM

## 2018-01-19 DIAGNOSIS — R53.1 WEAKNESS: ICD-10-CM

## 2018-01-19 DIAGNOSIS — M25.60 STIFFNESS IN JOINT: ICD-10-CM

## 2018-01-19 PROCEDURE — 97530 THERAPEUTIC ACTIVITIES: CPT | Mod: PN

## 2018-01-19 NOTE — PROGRESS NOTES
"TIME RECORD    Date:  01/19/2018    Start Time: 1210  Stop Time: 105    PROCEDURES:    TIMED  Procedure Min.   MT 15   TE Supervised 20 NC                 UNTIMED  Procedure Min.   MHP 10 NC   CP 10 NC     Total Timed Minutes:  15  Total Timed Units:  1  Total Untimed Units: 0  Charges Billed/# of units:    TAx1    Progress/Current Status    Subjective:     Physician Name:  Gregory Villaseñor  Primary Diagnosis:  R 3 part fracture s/p ORIF  Treatment Diagnosis:  Pain in limb, weakness, stiffness in joint  Onset Date:  DOS:11/03/17  Eval Date:  11/22/2017  Certification Period:  11/22/2017 to 1/22/18  Precautions: NWB    Pain: 7-8/10  "I'm really hurting today."  Pt reports she is out of pain meds and her pain is increased.     Objective:     Pt seen by OT for R 3 part fx, s/p ORIF 9  weeks, 5 days p/o with tx as follows:  MHP x 10 min to R shld for pain mgmt and tissue extensibility.  MT x 10 min consisting of patient supine for R shoulder lateral telescoping, UT STM/TPR, STM to upper arm, pec lift, subscapularis MFR and stretch, gentle PROM to end range, GHJ inferior anterior posterior glides grade I-III, gentle shoulder oscillations and scar mgmt.   Pt participates in OT therex for ROM R shoulder per tx log adhering to post op protocol.   Exercises Date 01/19/2018     Visit #9   PROM (R) Shoulder FLEX/ABD/IR/ER 10x   Isometrics f/abd/add/ext/er/ir 10 reps ea with 5 second hold   Upper traps stretch 3/30" hold   scap retraction 30x   Shoulder shrugs 30x   Scap prot/retr 30x   pendulums 30x each way   Supine dowel shoulder flex to 90/chest press 2/15 NT   -ER 2/10   S/L ABD/ER 2/10 NT   pulleys Facing wall 3min   wall slides 10x   Tband Lats/Rows Red   2/15  NT       Declined CP at end of session.    Assessment:     Pt with fair/poor tolerance of therapy this date secondary to increased pain. Pt with tightness and trigger points in upper traps causing increased pain this session. She was unable to complete TE program. " Pt reports decreased pain after manual therapy and ice.  Encouraged pt to limit activity this weekend and rest, using ice/heat PRN and to continue with gentle AROM exercises if tolerated. Pt still limited by weakness, pain and decreased ROM. Pt would benefit from continued OT to address these deficits.     Patient Education/Response:     Cont HEP    Plans and Goals:     Cont OT 2 times a week for 8 weeks.    Goals to be met in 4 weeks: (12/22/17)  1) Initiate Hep   2) Pt will increase R shoulder PROM by 10 degrees grossly for improved performance with overhead ADL's  3) Pt will report 6/10 pain in (R)shoulder at worst  4) Pt will progress to AROM/strengthening therex  5) Patient will be able to achieve less than or equal to 35% on FOTO shoulder survey demonstrating overall improved functional ability with upper extremity.      Goals to be met by discharge:  1) Independent with HEP  2) Pt will demonstrate (R) shoulder AROM WFL grossly for [place] with ADL's  3) Pt will demonstrate (R) shoulder MMT WFL grossly for [place] with functional activities  4) Independent and pain free with ADL's and IADL's  5) Patient will be able to achieve less than or equal to 15% on FOTO shoulder survey demonstrating overall improved functional ability with upper extremity.

## 2018-01-24 ENCOUNTER — CLINICAL SUPPORT (OUTPATIENT)
Dept: REHABILITATION | Facility: HOSPITAL | Age: 63
End: 2018-01-24
Payer: MEDICAID

## 2018-01-24 DIAGNOSIS — M79.601 PAIN OF RIGHT UPPER EXTREMITY: ICD-10-CM

## 2018-01-24 DIAGNOSIS — R53.1 WEAKNESS: ICD-10-CM

## 2018-01-24 DIAGNOSIS — M25.60 STIFFNESS IN JOINT: ICD-10-CM

## 2018-01-24 PROCEDURE — 97530 THERAPEUTIC ACTIVITIES: CPT | Mod: PN

## 2018-01-24 NOTE — PROGRESS NOTES
"TIME RECORD    Date:  01/24/2018    Start Time: 100  Stop Time: 200    PROCEDURES:    TIMED  Procedure Min.   MT 15   TE 25             UNTIMED  Procedure Min.   MHP 10 NC   CP 10 NC     Total Timed Minutes:  40  Total Timed Units:  3  Total Untimed Units: 0  Charges Billed/# of units:    TAx3    Progress/Current Status    Subjective:     Physician Name:  Gregory Villaseñor  Primary Diagnosis:  R 3 part fracture s/p ORIF  Treatment Diagnosis:  Pain in limb, weakness, stiffness in joint  Onset Date:  DOS:11/03/17  Eval Date:  11/22/2017  Certification Period:  1/24/18-3/24/2018  Precautions: NWB    Pain: 4/10  "I feel better than I did last time."    Objective:     Pt seen by OT for R 3 part fx, s/p ORIF 10  weeks, 3 days p/o with tx as follows:  MHP x 10 min to R shld for pain mgmt and tissue extensibility.  MT x 10 min consisting of patient supine for R shoulder lateral telescoping, UT STM/TPR, STM to upper arm, pec lift, subscapularis MFR and stretch, gentle PROM to end range, GHJ inferior anterior posterior glides grade I-III, gentle shoulder oscillations and scar mgmt.   Pt participates in OT therex for ROM R shoulder per tx log adhering to post op protocol.   Exercises Date 01/24/2018     Visit #10   PROM (R) Shoulder FLEX/ABD/IR/ER 10x   Isometrics f/abd/add/ext/er/ir 10 reps ea with 5 second hold   Upper traps stretch 3/30" hold   scap retraction 30x   Shoulder shrugs 30x   Scap prot/retr 30x   pendulums 30x each way   Supine dowel shoulder flex to 90/chest press 2/15 NT   -ER 2/10   S/L ABD/ER 2/10 NT   pulleys Facing wall 3min   wall slides 10x   Tband Lats/Rows Red   2/15  NT       Declined CP at end of session.    Assessment:     See update POC    Patient Education/Response:     Cont HEP    Plans and Goals:     Cont OT 2 times a week for 8 weeks.      "

## 2018-01-24 NOTE — PLAN OF CARE
OCCUPATIONAL THERAPY UPDATED PLAN OF TREATMENT    Physician Name:  Gregory Villaseñor  Primary Diagnosis:  R 3 part fracture s/p ORIF  Treatment Diagnosis:  Pain in limb, weakness, stiffness in joint  Onset Date:  DOS:11/03/17  Eval Date:  11/22/2017  Certification Period:  1/24/18-3/24/2018  Precautions: universal    Objective measurements as follows:  Range of Motion:   Shoulder  Right     AROM PROM   flexion 110 150(=)   extension 40 WNL   abduction 85 135(+5)   adduction 15 N/T   Internal rotation L4 90(=)   ER at 90° abd 64 60(-10)   ER at 0° abd 40 75(=)      FOTO:54%  Updated Assessment:  Pt has attended OT x 2 months for treatment R shoulder pain, stiffness and weakness associated with fall resulting in a 3 part fracture. Pt has been motivated and compliant throughout course of care. Pt with overall improved passive and active ROM in R shoulder. She has a slight improvement in FOTO score indicating increased functional use RUE with self care tasks. Pt's continues with some scar tissue and trigger points noted however improved significantly since eval. Pt is progressing well to goals. Pt has Met 4/5 goals. Pt continues to have weakness, pain and stiffness all of which limit her functionally. Pt would continue to benefit from skilled OT 2 x week x 8 weeks to address deficits.     Previous Short Term Goals Status:  Goals to be met in 4 weeks: (12/22/17)  1) Initiate Hep-met  2) Pt will increase R shoulder PROM by 10 degrees grossly for improved performance with overhead ADL's-met  3) Pt will report 6/10 pain in (R)shoulder at worst-met   4) Pt will progress to AROM/strengthening therex-met  5) Patient will be able to achieve less than or equal to 35% on FOTO shoulder survey demonstrating overall improved functional ability with upper extremity-not met.      New Short Term Goals (to be achieved by end of cert. Period):    Goals to be met by discharge:  1) Independent with HEP  2) Pt will demonstrate (R) shoulder  AROM WFL grossly for [place] with ADL's  3) Pt will demonstrate (R) shoulder MMT WFL grossly for [place] with functional activities  4) Independent and pain free with ADL's and IADL's  5) Patient will be able to achieve less than or equal to 15% on FOTO shoulder survey demonstrating overall improved functional ability with upper extremity.      Long Term Goal Status: Continue per initial plan of care    Reasons for Recertification of Therapy:  Update plan of care    Recommended Treatment Plan: Therapeutic Exercise, Functional Activities, Patient Education, Home Exercise Program, Ultrasound/Phonophoresis, Moist Heat/Ice/Paraffin, Functional Standing and Manual Therapy    Other recommendations:  FEMI/ELIZABETH PIERCEN    Therapist's Name: Maritza COWART/L       Date: 01/24/2018     I CERTIFY THE NEED FOR THESE SERVICES FURNISHED UNDER THIS PLAN OF TREATMENT AND WHILE UNDER MY CARE    Physician's comments: ________________________________________________________________________________________________________________________________________________      Physician's Name (print): ___________________________________    Physician's Signature: _______________________________________________    Date: ________________________

## 2018-01-31 ENCOUNTER — CLINICAL SUPPORT (OUTPATIENT)
Dept: REHABILITATION | Facility: HOSPITAL | Age: 63
End: 2018-01-31
Payer: MEDICAID

## 2018-01-31 DIAGNOSIS — M25.60 STIFFNESS IN JOINT: ICD-10-CM

## 2018-01-31 DIAGNOSIS — M79.601 PAIN OF RIGHT UPPER EXTREMITY: ICD-10-CM

## 2018-01-31 DIAGNOSIS — R53.1 WEAKNESS: ICD-10-CM

## 2018-01-31 PROCEDURE — 97530 THERAPEUTIC ACTIVITIES: CPT | Mod: PN

## 2018-01-31 NOTE — PROGRESS NOTES
"TIME RECORD    Date:  01/31/2018    Start Time: 110  Stop Time: 210    PROCEDURES:    TIMED  Procedure Min.   MT 10   TE 40             UNTIMED  Procedure Min.   MHP 10 NC         Total Timed Minutes:  50  Total Timed Units:  4  Total Untimed Units: 0  Charges Billed/# of units:    TAx4    Progress/Current Status    Subjective:     Physician Name:  Gregory Villaseñor  Primary Diagnosis:  R 3 part fracture s/p ORIF  Treatment Diagnosis:  Pain in limb, weakness, stiffness in joint  Onset Date:  DOS:11/03/17  Eval Date:  11/22/2017  Certification Period:  1/24/18-3/24/2018  Precautions: universal    Pain: 3/10  "I feel like I am reaching my arm a little higher after that treatment."    Objective:     Pt seen by OT for R 3 part fx, s/p ORIF 11  weeks, 3 days p/o with tx as follows:  MHP x 10 min to R shld for pain mgmt and tissue extensibility.  MT x 10 min consisting of patient supine for R shoulder lateral telescoping, UT STM/TPR, STM to upper arm, pec lift, subscapularis MFR and stretch, gentle PROM to end range, GHJ inferior anterior posterior glides grade I-III, gentle shoulder oscillations and scar mgmt. Static cupping with incision/scar tissue.   Pt participates in OT therex for ROM R shoulder per tx log adhering to post op protocol.   Exercises Date 01/31/2018     Visit #11   PROM (R) Shoulder FLEX/ABD/IR/ER 10x   Upper traps stretch 3/30" hold   Supine dowel shoulder flex  2#  2/15    S/L ABD/ER 1#  2/10    pulleys 3min   wall slides 2/15   Tband Lats/Rows Red   2/15       Declined CP at end of session.    Assessment:     Pt participated well this date. Continues with dense scar tissue in arm however responded well to cupping. Pt tolerated therex well. Noted increased ROM after cupping. Pt progressing fairly to goals. Continues to be motivated, progressing fairly to goals. Pt would continue to benefit from skilled OT to address weakness, decreased ROM and pain all of which impact her daily activities.     Patient " Education/Response:     Cont HEP    Plans and Goals:     Cont OT 2 times a week for 8 weeks.

## 2018-02-07 ENCOUNTER — CLINICAL SUPPORT (OUTPATIENT)
Dept: REHABILITATION | Facility: HOSPITAL | Age: 63
End: 2018-02-07
Payer: MEDICAID

## 2018-02-07 DIAGNOSIS — R53.1 WEAKNESS: ICD-10-CM

## 2018-02-07 DIAGNOSIS — M79.601 PAIN OF RIGHT UPPER EXTREMITY: ICD-10-CM

## 2018-02-07 DIAGNOSIS — M25.60 STIFFNESS IN JOINT: ICD-10-CM

## 2018-02-07 PROCEDURE — 97530 THERAPEUTIC ACTIVITIES: CPT | Mod: PN

## 2018-02-07 NOTE — PROGRESS NOTES
"TIME RECORD    Date:  02/07/2018    Start Time: 110  Stop Time: 200    PROCEDURES:    TIMED  Procedure Min.   MT 10   TE 30             UNTIMED  Procedure Min.   MHP 10 NC         Total Timed Minutes:  40  Total Timed Units:  3  Total Untimed Units: 0  Charges Billed/# of units:    TAx3    Progress/Current Status    Subjective:     Physician Name:  Gregory Villaseñor  Primary Diagnosis:  R 3 part fracture s/p ORIF  Treatment Diagnosis:  Pain in limb, weakness, stiffness in joint  Onset Date:  DOS:11/03/17  Eval Date:  11/22/2017  Certification Period:  1/24/18-3/24/2018  Precautions: universal    Pain: 3/10  "I go see the doctor next week, I feel like I'm really improving."    Objective:     Pt seen by OT for R 3 part fx, s/p ORIF 12  weeks, 3 days p/o with tx as follows:  MHP x 10 min to R shld for pain mgmt and tissue extensibility.  MT x 10 min consisting of patient supine for R shoulder lateral telescoping, UT STM/TPR, STM to upper arm, pec lift, subscapularis MFR and stretch, gentle PROM to end range, GHJ inferior anterior posterior glides grade I-III, gentle shoulder oscillations and scar mgmt. Static cupping with incision/scar tissue.   Pt participates in OT therex for ROM R shoulder per tx log adhering to post op protocol.   Exercises Date 02/07/2018     Visit #12   PROM (R) Shoulder FLEX/ABD/IR/ER 10x   Upper traps stretch 3/30" hold   Supine dowel shoulder flex  3#  2/15    S/L ABD/ER 2#  2/10    Sleeper stretch 3/30"   pulleys 3min   Corner stretch 3/30"   wall slides Green ball  2/15   Tband Lats/Rows green  2/15    -IR/ER Red  2/15   IR pulley stretch 3/30"      Range of Motion:   Shoulder   Right     AROM PROM   flexion 125(+15) 150(=)   extension 55(+15) WNL   abduction 115(+30) 150(+15)   adduction 20(+5) N/T   Internal rotation L4(=) 90(=)   ER at 90° abd 75(+11) 70(+10)   ER at 0° abd 45(+5) 80(+5)      Declined CP at end of session.    Assessment:     Pt participated well this date. Scar tissue at " incision improving and more pliable this date. Pt tolerated all therex well and able to progress with resistance and weight without c/o. Improved ROM with all motions. Pt continues to be motivated and compliant. Pt progressing well to goals. Pt still with decreased ROM, weakness and pain all of which inhibit her habits, roles and routines. Pt would continue to benefit from skilled OT to address weakness, decreased ROM and pain all of which impact her daily activities.     Patient Education/Response:     Cont HEP    Plans and Goals:     Cont OT 2 times a week for 8 weeks.

## 2018-02-14 DIAGNOSIS — Z98.890 S/P ORIF (OPEN REDUCTION INTERNAL FIXATION) FRACTURE: Primary | ICD-10-CM

## 2018-02-14 DIAGNOSIS — Z87.81 S/P ORIF (OPEN REDUCTION INTERNAL FIXATION) FRACTURE: Primary | ICD-10-CM

## 2018-02-15 ENCOUNTER — OFFICE VISIT (OUTPATIENT)
Dept: ORTHOPEDICS | Facility: CLINIC | Age: 63
End: 2018-02-15
Payer: MEDICAID

## 2018-02-15 ENCOUNTER — HOSPITAL ENCOUNTER (OUTPATIENT)
Dept: RADIOLOGY | Facility: HOSPITAL | Age: 63
Discharge: HOME | End: 2018-02-15
Attending: ORTHOPAEDIC SURGERY
Payer: MEDICAID

## 2018-02-15 VITALS — WEIGHT: 121 LBS | BODY MASS INDEX: 22.84 KG/M2 | HEIGHT: 61 IN

## 2018-02-15 DIAGNOSIS — Z98.890 S/P ORIF (OPEN REDUCTION INTERNAL FIXATION) FRACTURE: Primary | ICD-10-CM

## 2018-02-15 DIAGNOSIS — Z98.890 S/P ORIF (OPEN REDUCTION INTERNAL FIXATION) FRACTURE: ICD-10-CM

## 2018-02-15 DIAGNOSIS — Z87.81 S/P ORIF (OPEN REDUCTION INTERNAL FIXATION) FRACTURE: Primary | ICD-10-CM

## 2018-02-15 DIAGNOSIS — S42.292D CLOSED 3-PART FRACTURE OF PROXIMAL HUMERUS, LEFT, WITH ROUTINE HEALING, SUBSEQUENT ENCOUNTER: ICD-10-CM

## 2018-02-15 DIAGNOSIS — Z87.81 S/P ORIF (OPEN REDUCTION INTERNAL FIXATION) FRACTURE: ICD-10-CM

## 2018-02-15 PROCEDURE — 99999 PR PBB SHADOW E&M-EST. PATIENT-LVL III: CPT | Mod: PBBFAC,,, | Performed by: ORTHOPAEDIC SURGERY

## 2018-02-15 PROCEDURE — 73030 X-RAY EXAM OF SHOULDER: CPT | Mod: 26,RT,, | Performed by: RADIOLOGY

## 2018-02-15 PROCEDURE — 73030 X-RAY EXAM OF SHOULDER: CPT | Mod: TC,PO,RT

## 2018-02-15 PROCEDURE — 99213 OFFICE O/P EST LOW 20 MIN: CPT | Mod: S$PBB,,, | Performed by: ORTHOPAEDIC SURGERY

## 2018-02-15 PROCEDURE — 3008F BODY MASS INDEX DOCD: CPT | Mod: ,,, | Performed by: ORTHOPAEDIC SURGERY

## 2018-02-15 PROCEDURE — 99213 OFFICE O/P EST LOW 20 MIN: CPT | Mod: PBBFAC,25,PN | Performed by: ORTHOPAEDIC SURGERY

## 2018-02-15 NOTE — PROGRESS NOTES
"DATE: 2/15/2018  PATIENT: Shari Carpenter    Attending Physician: Gregory Villaseñor M.D.    HISTORY:  Shari Carpenter is a 62 y.o. female who returns for follow up evaluation of  her right proximal humerus fracture.  She underwent ORIF 11/3/17.  She has been known therapy and she's made significant progress with improvement in her motion.  Pain is still reported at 6/10 today.    PMH/PSH/FamHx/SocHx:  Reviewed and unchanged from prior visit    ROS:  Constitution: Negative for chills, fever, and sweats. Negative for unexplained weight loss.  HENT: Negative for headaches and blurry vision.   Cardiovascular: Negative for chest pain, irregular heartbeat, leg swelling and palpitations.   Respiratory: Negative for cough and shortness of breath.   Gastrointestinal: Negative for abdominal pain, heartburn, nausea and vomiting.   Genitourinary: Negative for bladder incontinence and dysuria.   Musculoskeletal: Negative for systemic arthritis, joint swelling, muscle weakness and myalgias.   Neurological: Negative for numbness.   Psychiatric/Behavioral: Negative for depression.   Endocrine: Negative for polyuria.   Hematologic/Lymphatic: Negative for bleeding disorders.  Skin: Negative for poor wound healing.       EXAM:  Ht 5' 1" (1.549 m)   Wt 54.9 kg (121 lb)   BMI 22.86 kg/m²   Shari Carpenter is a well developed, well nourished female in no acute distress. Physical examination of the right shoulder evaluated the following:    Inspection, palpation and ROM of the cervical spine  Disc compression testing bilaterally  Inspection for swelling, ecchymosis, erythema, deformity and atrophy  Tenderness to palpation of the soft tissue and bony structures  Active and passive range of motion  Sensation of the shoulder and upper extremity  Motor strength in the deltoid, supraspinatus, internal rotators and external rotators  Impingement, apprehension, relocation and Speed's tests  Upper extremity vascular exam (skin temp,color, " capillary refill)  Inspection for pseudomotor signs    Remarkable findings included:  Well-healed surgical incision.  Forward elevation elevation to 150°, abduction 140°, external rotation with the arm abducted 90° is 80°, internal rotation to L2.  Motor strength nearly 5/5 in the supraspinatus and external rotators.  No impingement sign on exam          IMAGING:   X-rays of the right shoulder are personally reviewed.  Fracture alignment has been maintained.  Interval healing noted.  The small fragment supportis decreased in size but is still present    ASSESSMENT:  Status post ORIF right proximal humerus fracture, 11/3/17    PLAN:  The implications of the patient's evolution of symptoms and findings were explained to the patient in detail. Shari is doing much better.  She has regained significant motion.  She'll continue with therapy to work on terminal motion and endurance strength.  She may return to work full duty.  I will see her back in 6 weeks' time for reexam and follow-up x-rays of the right shoulder.          This note was dictated using voice recognition software. Please excuse any grammatical or typographical errors.

## 2018-02-28 ENCOUNTER — CLINICAL SUPPORT (OUTPATIENT)
Dept: REHABILITATION | Facility: HOSPITAL | Age: 63
End: 2018-02-28
Payer: MEDICAID

## 2018-02-28 DIAGNOSIS — R53.1 WEAKNESS: ICD-10-CM

## 2018-02-28 DIAGNOSIS — M79.601 PAIN OF RIGHT UPPER EXTREMITY: ICD-10-CM

## 2018-02-28 DIAGNOSIS — M25.60 STIFFNESS IN JOINT: ICD-10-CM

## 2018-02-28 PROCEDURE — 97530 THERAPEUTIC ACTIVITIES: CPT | Mod: PN

## 2018-02-28 NOTE — PROGRESS NOTES
"TIME RECORD    Date:  02/28/2018    Start Time: 120  Stop Time: 200    PROCEDURES:    TIMED  Procedure Min.   MT 10   TE 20   Sup TE 10N/C         UNTIMED  Procedure Min.             Total Timed Minutes:  30  Total Timed Units:  2  Total Untimed Units: 0  Charges Billed/# of units:    TAx2    **FOTO next session**    Progress/Current Status    Subjective:     Physician Name:  Gregory Villaseñor  Primary Diagnosis:  R 3 part fracture s/p ORIF  Treatment Diagnosis:  Pain in limb, weakness, stiffness in joint  Onset Date:  DOS:11/03/17  Eval Date:  11/22/2017  Certification Period:  1/24/18-3/24/2018  Precautions: universal    Pain: 2/10  "I saw the doctor he said everything looked really good and to continue with therapy another month."    Objective:     Pt seen by OT for R 3 part fx, s/p ORIF 12 (+) weeks p/o with tx as follows:  Pt on UBE for/rev x 6 min @120rpms.  MT x 10 min consisting of patient supine for R shoulder lateral telescoping, UT STM/TPR, STM to upper arm, pec lift, subscapularis MFR and stretch, gentle PROM to end range, GHJ inferior anterior posterior glides grade I-III, gentle shoulder oscillations and scar mgmt.   Pt participates in OT therex for ROM R shoulder per tx log adhering to post op protocol.   Exercises Date 02/28/2018     Visit #13   PROM (R) Shoulder FLEX/ABD/IR/ER 10x   Upper traps stretch 3/30" hold   Supine dowel shoulder flex  4#  2/15    S/L ABD/ER 2#  2/15   Sleeper stretch 3/30"   pulleys 3min   Corner stretch 3/30"   wall slides Green ball  2/15   Tband Lats/Rows green  2/15    -IR/ER Red  2/15   IR pulley stretch 3/30"      Declined CP at end of session.    Assessment:     Pt participated well this date.  Pt tolerated all therex well and able to progress with weight without c/o.  Pt continues to be motivated and compliant. Pt progressing well to goals. Pt still with decreased ROM, weakness and pain all of which inhibit her habits, roles and routines. Pt would continue to benefit " from skilled OT to address weakness, decreased ROM and pain all of which impact her daily activities.     Patient Education/Response:     Cont HEP    Plans and Goals:     Cont OT 2 times a week for 4 weeks.

## 2018-03-06 ENCOUNTER — CLINICAL SUPPORT (OUTPATIENT)
Dept: REHABILITATION | Facility: HOSPITAL | Age: 63
End: 2018-03-06
Payer: MEDICAID

## 2018-03-06 DIAGNOSIS — M25.60 STIFFNESS IN JOINT: ICD-10-CM

## 2018-03-06 DIAGNOSIS — M79.601 PAIN OF RIGHT UPPER EXTREMITY: ICD-10-CM

## 2018-03-06 DIAGNOSIS — R53.1 WEAKNESS: ICD-10-CM

## 2018-03-06 PROCEDURE — 97530 THERAPEUTIC ACTIVITIES: CPT | Mod: PN

## 2018-03-06 NOTE — PROGRESS NOTES
"TIME RECORD    Date:  03/06/2018    Start Time:820  Stop Time:900    PROCEDURES:    TIMED  Procedure Min.   MT 10   TE 30             UNTIMED  Procedure Min.             Total Timed Minutes:  40  Total Timed Units:  3  Total Untimed Units: 0  Charges Billed/# of units:    TAx3    FOTO:37%    Progress/Current Status    Subjective:     Physician Name:  Gregory Villaseñor  Primary Diagnosis:  R 3 part fracture s/p ORIF  Treatment Diagnosis:  Pain in limb, weakness, stiffness in joint  Onset Date:  DOS:11/03/17  Eval Date:  11/22/2017  Certification Period:  1/24/18-3/24/2018  Precautions: universal    Pain: 2/10  "I think I can continue on my own."    Objective:     Pt seen by OT for R 3 part fx, s/p ORIF 12 (+) weeks p/o with tx as follows:  Pt on UBE for/rev x 6 min @120rpms.  MT x 10 min consisting of patient supine for R shoulder lateral telescoping, UT STM/TPR, STM to upper arm, pec lift, subscapularis MFR and stretch, gentle PROM to end range, GHJ inferior anterior posterior glides grade I-III, gentle shoulder oscillations and scar mgmt.   Pt participates in OT therex for ROM R shoulder per tx log adhering to post op protocol.   Exercises Date 03/06/2018     Visit #14   PROM (R) Shoulder FLEX/ABD/IR/ER 10x   Upper traps stretch 3/30" hold   Supine dowel shoulder flex  4#  2/15    S/L ABD/ER 2#  2/15   Sleeper stretch 3/30"   pulleys 3min   Corner stretch 3/30"   wall slides Green ball  2/15   Tband Lats/Rows green  2/15    -IR/ER Red  2/15   IR pulley stretch 3/30"      Range of Motion:   Shoulder   Right     AROM PROM   flexion 155(+30) 150(=)   extension 55(=) WNL   abduction 140(+25) 150(=)   adduction 30(+10) N/T   Internal rotation L4(=) 90(=)   ER at 90° abd 80(+5) 90(+20)   ER at 0° abd 70(+25) 80(=)      Declined CP at end of session.    Assessment:     Pt has attended OT x 3 months for treatment weakness, stiffness and pain s/p R 3 part fracture with ORIF. Pt has been motivated and pleasant throughout " course of care. She demonstrated overall improved ROM and strength in R UE. Pt now has both ROM and strength WFL grossly in RUE. She has an improved FOTO score indicating increased functional use RUE with self care tasks. Pt has met 4/5 goals. Pt Independent and pain free with ADL's, IADL's and HEP. Pt no longer requires skilled OT as she is no longer functionally limited by RUE. Pt discharged to HEP at this time.   Patient Education/Response:     Issued updated HEP with green band-pt demonstrated all correctly and without c/o.     Plans and Goals:     Discharge from OT    New Short Term Goals (to be achieved by end of cert. Period):    Goals to be met by discharge:  1) Independent with HEP-met  2) Pt will demonstrate (R) shoulder AROM WFL grossly for [place] with ADL's-met  3) Pt will demonstrate (R) shoulder MMT WFL grossly for [place] with functional activities-met  4) Independent and pain free with ADL's and IADL's-met  5) Patient will be able to achieve less than or equal to 15% on FOTO shoulder survey demonstrating overall improved functional ability with upper extremity-not met.      REHAB SERVICES OUTPATIENT DISCHARGE SUMMARY  Occupational Therapy  Name:  Shari Carpenter  Date:  03/06/2018  Date of Evaluation:  11/22/2017  Physician:  Gregory Villaseñor  Total # Of Visits:  14  Cancelled:  -  No Shows:  -   Diagnosis:    1. Pain of right upper extremity     2. Weakness     3. Stiffness in joint     Physical/Functional Status:  At time of discharge, patient was able to see above note for details  The patient is to be discharged from our Therapy service for the following reason(s):  Patient requested discharge  Degree of Goal Achievement:  Patient has partially met goals  Patient Education:  On current condition and HEP  Discharge Plan:  Home Program:  See  in media

## 2018-03-28 DIAGNOSIS — M25.511 ACUTE PAIN OF RIGHT SHOULDER: Primary | ICD-10-CM

## 2018-03-29 ENCOUNTER — HOSPITAL ENCOUNTER (OUTPATIENT)
Dept: RADIOLOGY | Facility: HOSPITAL | Age: 63
Discharge: HOME | End: 2018-03-29
Attending: ORTHOPAEDIC SURGERY
Payer: MEDICAID

## 2018-03-29 ENCOUNTER — OFFICE VISIT (OUTPATIENT)
Dept: ORTHOPEDICS | Facility: CLINIC | Age: 63
End: 2018-03-29
Payer: MEDICAID

## 2018-03-29 VITALS — BODY MASS INDEX: 23.66 KG/M2 | RESPIRATION RATE: 14 BRPM | HEIGHT: 61 IN | WEIGHT: 125.31 LBS

## 2018-03-29 DIAGNOSIS — S42.292D CLOSED 3-PART FRACTURE OF PROXIMAL HUMERUS, LEFT, WITH ROUTINE HEALING, SUBSEQUENT ENCOUNTER: ICD-10-CM

## 2018-03-29 DIAGNOSIS — Z98.890 S/P ORIF (OPEN REDUCTION INTERNAL FIXATION) FRACTURE: Primary | ICD-10-CM

## 2018-03-29 DIAGNOSIS — Z87.81 S/P ORIF (OPEN REDUCTION INTERNAL FIXATION) FRACTURE: Primary | ICD-10-CM

## 2018-03-29 DIAGNOSIS — M25.511 ACUTE PAIN OF RIGHT SHOULDER: ICD-10-CM

## 2018-03-29 PROCEDURE — 73030 X-RAY EXAM OF SHOULDER: CPT | Mod: 26,RT,, | Performed by: RADIOLOGY

## 2018-03-29 PROCEDURE — 73030 X-RAY EXAM OF SHOULDER: CPT | Mod: TC,PO,RT

## 2018-03-29 PROCEDURE — 99213 OFFICE O/P EST LOW 20 MIN: CPT | Mod: S$PBB,,, | Performed by: ORTHOPAEDIC SURGERY

## 2018-03-29 PROCEDURE — 99999 PR PBB SHADOW E&M-EST. PATIENT-LVL III: CPT | Mod: PBBFAC,,, | Performed by: ORTHOPAEDIC SURGERY

## 2018-03-29 PROCEDURE — 99213 OFFICE O/P EST LOW 20 MIN: CPT | Mod: PBBFAC,25,PN | Performed by: ORTHOPAEDIC SURGERY

## 2018-03-29 RX ORDER — DICLOFENAC SODIUM 10 MG/G
GEL TOPICAL
COMMUNITY
Start: 2018-03-22 | End: 2018-03-29

## 2018-03-29 RX ORDER — HYDROCODONE BITARTRATE AND ACETAMINOPHEN 10; 325 MG/1; MG/1
TABLET ORAL
Refills: 0 | COMMUNITY
Start: 2018-03-26 | End: 2019-03-09 | Stop reason: ALTCHOICE

## 2018-03-29 RX ORDER — NAPROXEN 500 MG/1
TABLET ORAL
COMMUNITY
Start: 2018-03-22 | End: 2020-02-10

## 2018-03-29 RX ORDER — GABAPENTIN 400 MG/1
CAPSULE ORAL
COMMUNITY
Start: 2018-03-22 | End: 2020-02-10

## 2018-03-29 NOTE — PROGRESS NOTES
"DATE: 3/29/2018  PATIENT: Shari Carpenter    Attending Physician: Gregory Villaseñor M.D.    HISTORY:  Shari Carpenter is a 62 y.o. female who returns for follow up evaluation of  her right shoulder.  She is status post ORIF right proximal humerus fracture, 11/3/17.  She continues to improve with regards to pain and motion.  However she does note some stiffness and discomfort with overhead activities.  Pain is reported at 2/day.  She recently completed therapy.    PMH/PSH/FamHx/SocHx:  Reviewed and unchanged from prior visit    ROS:  Constitution: Negative for chills, fever, and sweats. Negative for unexplained weight loss.  HENT: Negative for headaches and blurry vision.   Cardiovascular: Negative for chest pain, irregular heartbeat, leg swelling and palpitations.   Respiratory: Negative for cough and shortness of breath.   Gastrointestinal: Negative for abdominal pain, heartburn, nausea and vomiting.   Genitourinary: Negative for bladder incontinence and dysuria.   Musculoskeletal: Negative for systemic arthritis, joint swelling, muscle weakness and myalgias.   Neurological: Negative for numbness.   Psychiatric/Behavioral: Negative for depression.   Endocrine: Negative for polyuria.   Hematologic/Lymphatic: Negative for bleeding disorders.  Skin: Negative for poor wound healing.       EXAM:  Resp 14   Ht 5' 1" (1.549 m)   Wt 56.8 kg (125 lb 5.3 oz)   BMI 23.68 kg/m²   Shari Carpenter is a well developed, well nourished female in no acute distress. Physical examination of the right shoulder evaluated the following:    Inspection, palpation and ROM of the cervical spine  Disc compression testing bilaterally  Inspection for swelling, ecchymosis, erythema, deformity and atrophy  Tenderness to palpation of the soft tissue and bony structures  Active and passive range of motion  Sensation of the shoulder and upper extremity  Motor strength in the deltoid, supraspinatus, internal rotators and external " rotators  Impingement, apprehension, relocation and Speed's tests  Upper extremity vascular exam (skin temp,color, capillary refill)  Inspection for pseudomotor signs    Remarkable findings included:  Well-healed surgical incision.  Range of motion shoulder is 150° of forward elevation, 140° of abduction, external rotation with the arm abducted 90° and 70°.  Motor strength 5/5 in supraspinatus and external rotators.  No impingement sign on exam.        IMAGING:   X-rays of the right shoulder are performed and personally reviewed.  Fracture alignment remains unchanged.  Hardware in good position.  Dr. Woods.    ASSESSMENT:  Status post ORIF right proximal humerus fracture, 11/3/17    PLAN:  The implications of the patient's evolution of symptoms and findings were explained to the patient in detail.. All qu overall on them well.  She does have mild pain with sharp as well as related to her loss of motion.  She will continue to work on terminal recollection of external rotation on a home exercise program.  Activities may be performed as tolerated.  Follow-up in 3 months time for reexam and follow-up x-rays to include internal and external rotation views of the right shoulder    This note was dictated using voice recognition software. Please excuse any grammatical or typographical errors.

## 2018-06-26 DIAGNOSIS — Z87.81 S/P ORIF (OPEN REDUCTION INTERNAL FIXATION) FRACTURE: Primary | ICD-10-CM

## 2018-06-26 DIAGNOSIS — Z98.890 S/P ORIF (OPEN REDUCTION INTERNAL FIXATION) FRACTURE: Primary | ICD-10-CM

## 2018-06-28 ENCOUNTER — OFFICE VISIT (OUTPATIENT)
Dept: ORTHOPEDICS | Facility: CLINIC | Age: 63
End: 2018-06-28
Payer: MEDICAID

## 2018-06-28 ENCOUNTER — HOSPITAL ENCOUNTER (OUTPATIENT)
Dept: RADIOLOGY | Facility: HOSPITAL | Age: 63
Discharge: HOME | End: 2018-06-28
Attending: ORTHOPAEDIC SURGERY
Payer: MEDICAID

## 2018-06-28 VITALS — WEIGHT: 125 LBS | BODY MASS INDEX: 23.6 KG/M2 | HEIGHT: 61 IN

## 2018-06-28 DIAGNOSIS — Z87.81 S/P ORIF (OPEN REDUCTION INTERNAL FIXATION) FRACTURE: Primary | ICD-10-CM

## 2018-06-28 DIAGNOSIS — Z98.890 S/P ORIF (OPEN REDUCTION INTERNAL FIXATION) FRACTURE: ICD-10-CM

## 2018-06-28 DIAGNOSIS — Z98.890 S/P ORIF (OPEN REDUCTION INTERNAL FIXATION) FRACTURE: Primary | ICD-10-CM

## 2018-06-28 DIAGNOSIS — S42.292D CLOSED 3-PART FRACTURE OF PROXIMAL HUMERUS, LEFT, WITH ROUTINE HEALING, SUBSEQUENT ENCOUNTER: ICD-10-CM

## 2018-06-28 DIAGNOSIS — Z87.81 S/P ORIF (OPEN REDUCTION INTERNAL FIXATION) FRACTURE: ICD-10-CM

## 2018-06-28 PROCEDURE — 99212 OFFICE O/P EST SF 10 MIN: CPT | Mod: PBBFAC,25,PN | Performed by: ORTHOPAEDIC SURGERY

## 2018-06-28 PROCEDURE — 73030 X-RAY EXAM OF SHOULDER: CPT | Mod: TC,PO,RT

## 2018-06-28 PROCEDURE — 73030 X-RAY EXAM OF SHOULDER: CPT | Mod: 26,RT,, | Performed by: RADIOLOGY

## 2018-06-28 PROCEDURE — 99213 OFFICE O/P EST LOW 20 MIN: CPT | Mod: S$PBB,,, | Performed by: ORTHOPAEDIC SURGERY

## 2018-06-28 PROCEDURE — 99999 PR PBB SHADOW E&M-EST. PATIENT-LVL II: CPT | Mod: PBBFAC,,, | Performed by: ORTHOPAEDIC SURGERY

## 2018-06-28 NOTE — PROGRESS NOTES
"DATE: 6/28/2018  PATIENT: Shari Carpenter    Attending Physician: Gregory Villaseñor M.D.    HISTORY:  Shari Carpenter is a 62 y.o. female who returns for follow up evaluation of  her right proximal humerus fracture.  Date of surgery was 11/3/17.  She continues to improve with motion and strength.  Pain is reported at 6/10 today.    PMH/PSH/FamHx/SocHx:  Reviewed and unchanged from prior visit    ROS:  Constitution: Negative for chills, fever, and sweats. Negative for unexplained weight loss.  HENT: Negative for headaches and blurry vision.   Cardiovascular: Negative for chest pain, irregular heartbeat, leg swelling and palpitations.   Respiratory: Negative for cough and shortness of breath.   Gastrointestinal: Negative for abdominal pain, heartburn, nausea and vomiting.   Genitourinary: Negative for bladder incontinence and dysuria.   Musculoskeletal: Negative for systemic arthritis, joint swelling, muscle weakness and myalgias.   Neurological: Negative for numbness.   Psychiatric/Behavioral: Negative for depression.   Endocrine: Negative for polyuria.   Hematologic/Lymphatic: Negative for bleeding disorders.  Skin: Negative for poor wound healing.       EXAM:  Ht 5' 1" (1.549 m)   Wt 56.7 kg (125 lb)   BMI 23.62 kg/m²   Shari Carpenter is a well developed, well nourished female in no acute distress. Physical examination of the right shoulder evaluated the following:    Inspection, palpation and ROM of the cervical spine  Disc compression testing bilaterally  Inspection for swelling, ecchymosis, erythema, deformity and atrophy  Tenderness to palpation of the soft tissue and bony structures  Active and passive range of motion  Sensation of the shoulder and upper extremity  Motor strength in the deltoid, supraspinatus, internal rotators and external rotators  Impingement, apprehension, relocation and Speed's tests  Upper extremity vascular exam (skin temp,color, capillary refill)  Inspection for pseudomotor " signs    Remarkable findings included:  Well-healed surgical incision.  Range of motion shoulder is 160° of forward elevation, 160° of abduction, external rotation with the arm abducted 90° and 80°.  Motor strength 5/5 in supraspinatus and external rotators.  No impingement sign on exam.        IMAGING:   X-rays the right shoulder are personally reviewed.  Fracture is healed.  Alignment is acceptable.  Hardware in satisfactory position.    ASSESSMENT:  Status post ORIF right proximal humerus fracture, 11/3/17    PLAN:  The implications of the patient's evolution of symptoms and findings were explained to the patient in detail.Shari is doing well postoperatively.  She'll continue working on terminal motion and endurance strength.  Activities may be performed as tolerated.  I will see her back in 3 months time for final exam and x-rays of the right shoulder.        This note was dictated using voice recognition software. Please excuse any grammatical or typographical errors.

## 2018-09-25 DIAGNOSIS — M25.511 ACUTE PAIN OF RIGHT SHOULDER: Primary | ICD-10-CM

## 2018-09-27 ENCOUNTER — OFFICE VISIT (OUTPATIENT)
Dept: ORTHOPEDICS | Facility: CLINIC | Age: 63
End: 2018-09-27
Payer: MEDICAID

## 2018-09-27 ENCOUNTER — HOSPITAL ENCOUNTER (OUTPATIENT)
Dept: RADIOLOGY | Facility: HOSPITAL | Age: 63
Discharge: HOME | End: 2018-09-27
Attending: ORTHOPAEDIC SURGERY
Payer: MEDICAID

## 2018-09-27 VITALS — WEIGHT: 125 LBS | HEIGHT: 61 IN | BODY MASS INDEX: 23.6 KG/M2

## 2018-09-27 DIAGNOSIS — M25.511 ACUTE PAIN OF RIGHT SHOULDER: ICD-10-CM

## 2018-09-27 DIAGNOSIS — Z87.81 S/P ORIF (OPEN REDUCTION INTERNAL FIXATION) FRACTURE: Primary | ICD-10-CM

## 2018-09-27 DIAGNOSIS — Z98.890 S/P ORIF (OPEN REDUCTION INTERNAL FIXATION) FRACTURE: Primary | ICD-10-CM

## 2018-09-27 PROCEDURE — 99212 OFFICE O/P EST SF 10 MIN: CPT | Mod: PBBFAC,25,PN | Performed by: ORTHOPAEDIC SURGERY

## 2018-09-27 PROCEDURE — 73030 X-RAY EXAM OF SHOULDER: CPT | Mod: TC,PO,RT

## 2018-09-27 PROCEDURE — 99213 OFFICE O/P EST LOW 20 MIN: CPT | Mod: S$PBB,,, | Performed by: ORTHOPAEDIC SURGERY

## 2018-09-27 PROCEDURE — 73030 X-RAY EXAM OF SHOULDER: CPT | Mod: 26,RT,, | Performed by: RADIOLOGY

## 2018-09-27 PROCEDURE — 99999 PR PBB SHADOW E&M-EST. PATIENT-LVL II: CPT | Mod: PBBFAC,,, | Performed by: ORTHOPAEDIC SURGERY

## 2018-09-27 RX ORDER — ALPRAZOLAM 0.25 MG/1
0.25 TABLET ORAL 3 TIMES DAILY
Refills: 1 | COMMUNITY
Start: 2018-08-04 | End: 2020-02-10

## 2018-09-27 RX ORDER — DICLOFENAC SODIUM 10 MG/G
GEL TOPICAL
COMMUNITY
Start: 2018-09-13 | End: 2021-10-14

## 2018-09-27 NOTE — PROGRESS NOTES
"DATE: 9/27/2018  PATIENT: Shari Carpenter    Attending Physician: Gregory Villaseñor M.D.    HISTORY:  Shari Carpenter is a 63 y.o. female who returns for follow up evaluation of  her right proximal humerus fracture, D.O. I 11/03/2017.  She reports her symptoms are unchanged from last visit.  Pain is still reported at 7/10.  States her motion is good.  States her strength is near normal.  She presents for follow-up evaluation    PMH/PSH/FamHx/SocHx:  Reviewed and unchanged from prior visit    ROS:  Constitution: Negative for chills, fever, and sweats. Negative for unexplained weight loss.  HENT: Negative for headaches and blurry vision.   Cardiovascular: Negative for chest pain, irregular heartbeat, leg swelling and palpitations.   Respiratory: Negative for cough and shortness of breath.   Gastrointestinal: Negative for abdominal pain, heartburn, nausea and vomiting.   Genitourinary: Negative for bladder incontinence and dysuria.   Musculoskeletal: Negative for systemic arthritis, joint swelling, muscle weakness and myalgias.   Neurological: Negative for numbness.   Psychiatric/Behavioral: Negative for depression.   Endocrine: Negative for polyuria.   Hematologic/Lymphatic: Negative for bleeding disorders.  Skin: Negative for poor wound healing.       EXAM:  Ht 5' 1" (1.549 m)   Wt 56.7 kg (125 lb)   BMI 23.62 kg/m²   Shari Carpenter is a well developed, well nourished female in no acute distress. Physical examination of the right shoulder evaluated the following:     Inspection, palpation and ROM of the cervical spine  Disc compression testing bilaterally  Inspection for swelling, ecchymosis, erythema, deformity and atrophy  Tenderness to palpation of the soft tissue and bony structures  Active and passive range of motion  Sensation of the shoulder and upper extremity  Motor strength in the deltoid, supraspinatus, internal rotators and external rotators  Impingement, apprehension, relocation and Speed's " tests  Upper extremity vascular exam (skin temp,color, capillary refill)  Inspection for pseudomotor signs     Remarkable findings included:  Well-healed surgical incision.  Range of motion shoulder is 160° of forward elevation, 160° of abduction, external rotation with the arm abducted 90° and 80°.  Motor strength 5/5 in supraspinatus and external rotators.  No impingement sign on exam.       IMAGING:   X-rays are personally reviewed.  Fracture alignment has been maintained and is unchanged.  Fracture is healed.  There has been some resorption of the greater tuberosity.      ASSESSMENT:  Status post ORIF right proximal humerus fracture, 11/3/17.    PLAN:  The implications of the patient's evolution of symptoms and findings were explained to the patient in detail. Overall have explained that she has done well.  She does have some complaints.  At this point treatment options include either tolerating the symptoms versus arthroscopy with lysis of adhesions and hardware removal.  The pros and cons and risks and benefits were explained in detail.. All questions answered and the patient wishes to tolerate her symptoms. I have explained that I think her as I think overall she has had a fairly good result.  Should she have further problems, I will see her.  Otherwise, follow-up as needed..          This note was dictated using voice recognition software. Please excuse any grammatical or typographical errors.

## 2019-02-27 ENCOUNTER — HOSPITAL ENCOUNTER (EMERGENCY)
Facility: HOSPITAL | Age: 64
Discharge: HOME | End: 2019-02-27
Attending: EMERGENCY MEDICINE
Payer: MEDICAID

## 2019-02-27 VITALS
TEMPERATURE: 98 F | WEIGHT: 125 LBS | SYSTOLIC BLOOD PRESSURE: 160 MMHG | HEIGHT: 61 IN | OXYGEN SATURATION: 100 % | HEART RATE: 76 BPM | DIASTOLIC BLOOD PRESSURE: 72 MMHG | RESPIRATION RATE: 20 BRPM | BODY MASS INDEX: 23.6 KG/M2

## 2019-02-27 DIAGNOSIS — S39.012A BACK STRAIN, INITIAL ENCOUNTER: Primary | ICD-10-CM

## 2019-02-27 PROCEDURE — 96372 THER/PROPH/DIAG INJ SC/IM: CPT

## 2019-02-27 PROCEDURE — 63600175 PHARM REV CODE 636 W HCPCS: Performed by: PHYSICIAN ASSISTANT

## 2019-02-27 PROCEDURE — 99284 EMERGENCY DEPT VISIT MOD MDM: CPT | Mod: 25

## 2019-02-27 RX ORDER — KETOROLAC TROMETHAMINE 30 MG/ML
15 INJECTION, SOLUTION INTRAMUSCULAR; INTRAVENOUS
Status: COMPLETED | OUTPATIENT
Start: 2019-02-27 | End: 2019-02-27

## 2019-02-27 RX ORDER — TRIAMCINOLONE ACETONIDE 40 MG/ML
80 INJECTION, SUSPENSION INTRA-ARTICULAR; INTRAMUSCULAR
Status: COMPLETED | OUTPATIENT
Start: 2019-02-27 | End: 2019-02-27

## 2019-02-27 RX ADMIN — TRIAMCINOLONE ACETONIDE 80 MG: 40 INJECTION, SUSPENSION INTRA-ARTICULAR; INTRAMUSCULAR at 07:02

## 2019-02-27 RX ADMIN — KETOROLAC TROMETHAMINE 15 MG: 30 INJECTION, SOLUTION INTRAMUSCULAR at 07:02

## 2019-02-28 NOTE — ED TRIAGE NOTES
Pt presents to ED and reports L back pain and L hip pain with onset Sat while lifting and moving a grill. Pt states she has norco for pain with no relief. Pt states pain is 9/10 at this time.

## 2019-02-28 NOTE — ED PROVIDER NOTES
Encounter Date: 2/27/2019       History     Chief Complaint   Patient presents with    Back Pain     c/o pain across lower back x3 days, after moving bbq grill and cleaning her patio on Saturday.      62 y/o female presents to the ER with chief complaint of back pain x 5 days.  She was moving a barbeque grill when sweeping her patio when the pain began.    She has RA and is prescribed Norco  tid.  She took this medication at 1 pm without resolution of pain. Her pain is rated 8/10.  Her pain is on the left lower back.   She took 2 naproxen last night without improvement of her pain.  She is requesting an injection today.  She denies weakness of the extremities, bowel or bladder incontinence, dysuria, urinary frequency, urgency, abdominal pain, chest pain, SOB or additional complaints at this time.  .            Review of patient's allergies indicates:  No Known Allergies  Past Medical History:   Diagnosis Date    Arthritis     Migraine headache      Past Surgical History:   Procedure Laterality Date    OPEN REDUCTION INTERNAL FIXATION-HUMERUS Right 11/3/2017    Performed by Gregory [facility]ñor [facility] at [facility]    SHOULDER SURGERY Right      No family history on file.  Social History     Tobacco Use    Smoking status: Never Smoker    Smokeless tobacco: Never Used   Substance Use Topics    Alcohol use: No    Drug use: Not on file     Review of Systems   Constitutional: Negative for chills and fever.   HENT: Negative for sore throat.    Respiratory: Negative for shortness of breath.    Cardiovascular: Negative for chest pain.   Gastrointestinal: Negative for nausea and vomiting.   Genitourinary: Negative for dysuria.   Musculoskeletal: Positive for arthralgias, back pain and myalgias. Negative for neck pain.   Skin: Negative for color change, rash and wound.   Neurological: Negative for weakness.   Hematological: Does not bruise/bleed easily.       Physical Exam     Initial Vitals [02/27/19 1835]   BP Pulse  Resp Temp SpO2   (!) 160/72 76 20 98 °F (36.7 °C) 100 %      MAP       --         Physical Exam    Nursing note and vitals reviewed.  Constitutional: She appears well-developed and well-nourished.   HENT:   Head: Atraumatic.   Mouth/Throat: Oropharynx is clear and moist.   Eyes: Conjunctivae and EOM are normal. Pupils are equal, round, and reactive to light.   Neck: Normal range of motion. Neck supple.   Cardiovascular: Normal rate and regular rhythm.   Pulmonary/Chest: Breath sounds normal. No respiratory distress. She has no wheezes. She has no rhonchi. She has no rales.   Abdominal: Soft. Bowel sounds are normal. There is no tenderness. There is no guarding and no CVA tenderness.   Musculoskeletal:        Lumbar back: She exhibits tenderness (left lower back msucular tenderness). She exhibits no bony tenderness and no pain (no midline pain).   Neurological: She is alert and oriented to person, place, and time.   Skin: Skin is warm. Capillary refill takes less than 2 seconds. No rash noted.   Psychiatric: She has a normal mood and affect.         ED Course   Procedures  Labs Reviewed - No data to display       Imaging Results    None                APC / Resident Notes:   The patient's back pain is likely a musculoskeletal strain. Her pain began when moving a BBQ grill 5 days ago.  She denies fall or direct trauma to the back.   There are no signs of saddle anesthesia, incontinence, neurologic deficits, fevers, trauma or midline tenderness on history or physical to suggest cauda equina, infectious process, fracture or subluxation.  I will give toradol and kenalog IM as antiinflammatory and she will continue norco and naproxen for pain at home as previously prescribed.  Patient is comfortable with this plan.  She is ambulating without difficulty and is stable for discharge. She is given ER return precautions and advised to follow up with PCP within 1 week for ER follow exam.                        Clinical  Impression:       ICD-10-CM ICD-9-CM   1. Back strain, initial encounter S39.012A 847.9                                LUDWIG Chamberlain  02/28/19 1188

## 2019-03-09 ENCOUNTER — HOSPITAL ENCOUNTER (EMERGENCY)
Facility: HOSPITAL | Age: 64
Discharge: HOME | End: 2019-03-09
Attending: EMERGENCY MEDICINE
Payer: MEDICAID

## 2019-03-09 VITALS
HEIGHT: 61 IN | DIASTOLIC BLOOD PRESSURE: 74 MMHG | HEART RATE: 80 BPM | BODY MASS INDEX: 22.66 KG/M2 | SYSTOLIC BLOOD PRESSURE: 132 MMHG | RESPIRATION RATE: 18 BRPM | WEIGHT: 120 LBS | TEMPERATURE: 98 F | OXYGEN SATURATION: 20 %

## 2019-03-09 DIAGNOSIS — S32.020A CLOSED COMPRESSION FRACTURE OF SECOND LUMBAR VERTEBRA, INITIAL ENCOUNTER: ICD-10-CM

## 2019-03-09 DIAGNOSIS — N39.0 URINARY TRACT INFECTION WITH HEMATURIA, SITE UNSPECIFIED: ICD-10-CM

## 2019-03-09 DIAGNOSIS — R52 PAIN: ICD-10-CM

## 2019-03-09 DIAGNOSIS — S22.000A CLOSED COMPRESSION FRACTURE OF THORACIC VERTEBRA, INITIAL ENCOUNTER: Primary | ICD-10-CM

## 2019-03-09 DIAGNOSIS — R31.9 URINARY TRACT INFECTION WITH HEMATURIA, SITE UNSPECIFIED: ICD-10-CM

## 2019-03-09 LAB
ALBUMIN SERPL BCP-MCNC: 4.2 G/DL
ALP SERPL-CCNC: 135 U/L
ALT SERPL W/O P-5'-P-CCNC: 14 U/L
ANION GAP SERPL CALC-SCNC: 8 MMOL/L
AST SERPL-CCNC: 19 U/L
BACTERIA #/AREA URNS HPF: ABNORMAL /HPF
BASOPHILS # BLD AUTO: 0.03 K/UL
BASOPHILS NFR BLD: 0.3 %
BILIRUB SERPL-MCNC: 0.3 MG/DL
BILIRUB UR QL STRIP: NEGATIVE
BUN SERPL-MCNC: 14 MG/DL
CALCIUM SERPL-MCNC: 9.2 MG/DL
CHLORIDE SERPL-SCNC: 100 MMOL/L
CLARITY UR: CLEAR
CO2 SERPL-SCNC: 25 MMOL/L
COLOR UR: YELLOW
CREAT SERPL-MCNC: 0.7 MG/DL
DIFFERENTIAL METHOD: ABNORMAL
EOSINOPHIL # BLD AUTO: 0.1 K/UL
EOSINOPHIL NFR BLD: 1.3 %
ERYTHROCYTE [DISTWIDTH] IN BLOOD BY AUTOMATED COUNT: 15.8 %
EST. GFR  (AFRICAN AMERICAN): >60 ML/MIN/1.73 M^2
EST. GFR  (NON AFRICAN AMERICAN): >60 ML/MIN/1.73 M^2
GLUCOSE SERPL-MCNC: 87 MG/DL
GLUCOSE UR QL STRIP: NEGATIVE
HCT VFR BLD AUTO: 35.1 %
HGB BLD-MCNC: 11 G/DL
HGB UR QL STRIP: ABNORMAL
KETONES UR QL STRIP: NEGATIVE
LEUKOCYTE ESTERASE UR QL STRIP: ABNORMAL
LIPASE SERPL-CCNC: 15 U/L
LYMPHOCYTES # BLD AUTO: 1.7 K/UL
LYMPHOCYTES NFR BLD: 18.9 %
MCH RBC QN AUTO: 23.9 PG
MCHC RBC AUTO-ENTMCNC: 31.3 G/DL
MCV RBC AUTO: 76 FL
MICROSCOPIC COMMENT: ABNORMAL
MONOCYTES # BLD AUTO: 0.8 K/UL
MONOCYTES NFR BLD: 8.7 %
NEUTROPHILS # BLD AUTO: 6.3 K/UL
NEUTROPHILS NFR BLD: 70.7 %
NITRITE UR QL STRIP: POSITIVE
PH UR STRIP: 6 [PH] (ref 5–8)
PLATELET # BLD AUTO: 396 K/UL
PMV BLD AUTO: 10.5 FL
POTASSIUM SERPL-SCNC: 4 MMOL/L
PROT SERPL-MCNC: 7.4 G/DL
PROT UR QL STRIP: NEGATIVE
RBC # BLD AUTO: 4.6 M/UL
RBC #/AREA URNS HPF: 1 /HPF (ref 0–4)
SODIUM SERPL-SCNC: 133 MMOL/L
SP GR UR STRIP: 1.01 (ref 1–1.03)
SQUAMOUS #/AREA URNS HPF: 15 /HPF
URN SPEC COLLECT METH UR: ABNORMAL
UROBILINOGEN UR STRIP-ACNC: NEGATIVE EU/DL
WBC # BLD AUTO: 8.9 K/UL
WBC #/AREA URNS HPF: 30 /HPF (ref 0–5)

## 2019-03-09 PROCEDURE — 63600175 PHARM REV CODE 636 W HCPCS: Performed by: PHYSICIAN ASSISTANT

## 2019-03-09 PROCEDURE — 81000 URINALYSIS NONAUTO W/SCOPE: CPT

## 2019-03-09 PROCEDURE — 96375 TX/PRO/DX INJ NEW DRUG ADDON: CPT

## 2019-03-09 PROCEDURE — 85025 COMPLETE CBC W/AUTO DIFF WBC: CPT

## 2019-03-09 PROCEDURE — 87086 URINE CULTURE/COLONY COUNT: CPT

## 2019-03-09 PROCEDURE — 25500020 PHARM REV CODE 255: Performed by: EMERGENCY MEDICINE

## 2019-03-09 PROCEDURE — 99285 EMERGENCY DEPT VISIT HI MDM: CPT | Mod: 25

## 2019-03-09 PROCEDURE — 96374 THER/PROPH/DIAG INJ IV PUSH: CPT

## 2019-03-09 PROCEDURE — 80053 COMPREHEN METABOLIC PANEL: CPT

## 2019-03-09 PROCEDURE — 87077 CULTURE AEROBIC IDENTIFY: CPT

## 2019-03-09 PROCEDURE — 87088 URINE BACTERIA CULTURE: CPT

## 2019-03-09 PROCEDURE — 83690 ASSAY OF LIPASE: CPT

## 2019-03-09 PROCEDURE — 87186 SC STD MICRODIL/AGAR DIL: CPT

## 2019-03-09 RX ORDER — ONDANSETRON 4 MG/1
4 TABLET, ORALLY DISINTEGRATING ORAL EVERY 8 HOURS PRN
Qty: 15 TABLET | Refills: 0 | Status: SHIPPED | OUTPATIENT
Start: 2019-03-09 | End: 2020-02-10

## 2019-03-09 RX ORDER — OXYCODONE AND ACETAMINOPHEN 5; 325 MG/1; MG/1
1 TABLET ORAL EVERY 4 HOURS PRN
Qty: 18 TABLET | Refills: 0 | Status: ON HOLD | OUTPATIENT
Start: 2019-03-09 | End: 2020-02-01 | Stop reason: HOSPADM

## 2019-03-09 RX ORDER — MORPHINE SULFATE 4 MG/ML
4 INJECTION, SOLUTION INTRAMUSCULAR; INTRAVENOUS
Status: COMPLETED | OUTPATIENT
Start: 2019-03-09 | End: 2019-03-09

## 2019-03-09 RX ORDER — CEPHALEXIN 500 MG/1
500 CAPSULE ORAL EVERY 12 HOURS
Qty: 14 CAPSULE | Refills: 0 | Status: SHIPPED | OUTPATIENT
Start: 2019-03-09 | End: 2019-03-16

## 2019-03-09 RX ORDER — ONDANSETRON 2 MG/ML
4 INJECTION INTRAMUSCULAR; INTRAVENOUS
Status: COMPLETED | OUTPATIENT
Start: 2019-03-09 | End: 2019-03-09

## 2019-03-09 RX ADMIN — ONDANSETRON 4 MG: 2 INJECTION INTRAMUSCULAR; INTRAVENOUS at 03:03

## 2019-03-09 RX ADMIN — IOHEXOL 100 ML: 350 INJECTION, SOLUTION INTRAVENOUS at 05:03

## 2019-03-09 RX ADMIN — MORPHINE SULFATE 4 MG: 4 INJECTION INTRAVENOUS at 03:03

## 2019-03-09 RX ADMIN — CEFTRIAXONE 1 G: 1 INJECTION, SOLUTION INTRAVENOUS at 06:03

## 2019-03-09 NOTE — ED PROVIDER NOTES
Encounter Date: 3/9/2019       History     Chief Complaint   Patient presents with    Back Pain     pt was seen here for back pain recently, but denies improvement. States pain now has moved to her right lower back and radiates to right lower abdomen.      63-year-old female with PMH of migraine headaches, arthritis and osteoporosis presents the ED for evaluation of continued low back pain. Patient was seen approximately 2 week ago and this ED after she moved her [facility] pit and began with low back pain. She states that the low back pain has continued and now radiates to the abdominal region.  She does feel like she pulled her abdominal muscles as she states that is worse with certain positions and lifting of objects.  She does report a history of previous pelvic fracture and states that it feels similar to when this happened.  She denies any additional falls or recent trauma. She states that she has tried the medications recently prescribed however not on a consistent basis as she does not take medications.  She denies any fever, chills, nausea, vomiting, change in bowels, incontinence or  symptoms.      The history is provided by the patient.     Review of patient's allergies indicates:  No Known Allergies  Past Medical History:   Diagnosis Date    Arthritis     Migraine headache     Osteoporosis      Past Surgical History:   Procedure Laterality Date    OPEN REDUCTION INTERNAL FIXATION-HUMERUS Right 11/3/2017    Performed by Gregory Villaseñor [facility] at [facility] OR    SHOULDER SURGERY Right      History reviewed. No pertinent family history.  Social History     Tobacco Use    Smoking status: Never Smoker    Smokeless tobacco: Never Used   Substance Use Topics    Alcohol use: No    Drug use: Not on file     Review of Systems   Constitutional: Negative for chills and fever.   HENT: Negative for sore throat.    Respiratory: Negative for shortness of breath.    Cardiovascular: Negative for chest pain.    Gastrointestinal: Positive for abdominal pain. Negative for constipation, diarrhea, nausea and vomiting.   Genitourinary: Negative for dysuria, flank pain, hematuria, vaginal bleeding and vaginal discharge.   Musculoskeletal: Positive for back pain. Negative for joint swelling, neck pain and neck stiffness.   Skin: Negative for rash.   Neurological: Negative for weakness and numbness.   Hematological: Does not bruise/bleed easily.       Physical Exam     Initial Vitals [03/09/19 1403]   BP Pulse Resp Temp SpO2   (!) 194/94 84 18 98.2 °F (36.8 °C) 98 %      MAP       --         Vitals:    03/09/19 1911   BP: 132/74   Pulse: 80   Resp: 18   Temp: 97.9 °F (36.6 °C)       Physical Exam    Nursing note and vitals reviewed.  Constitutional: She appears well-developed and well-nourished. She is cooperative.  Non-toxic appearance. She does not appear ill. She appears distressed.   HENT:   Head: Normocephalic and atraumatic.   Eyes: Conjunctivae and lids are normal.   Neck: Neck supple. No neck rigidity.   Cardiovascular: Normal rate and regular rhythm.   Pulmonary/Chest: Breath sounds normal. No respiratory distress. She has no wheezes. She has no rhonchi.   Abdominal: Soft. Normal appearance and bowel sounds are normal. There is generalized tenderness. There is no rigidity, no rebound, no guarding and no CVA tenderness.   Musculoskeletal: Normal range of motion.        Lumbar back: She exhibits tenderness, bony tenderness, pain and spasm.        Back:         Legs:  Negative straight leg raise; neurovascularly intact. Strength equal bilaterally. No bony deformity, step-off or edema   Neurological: She is alert and oriented to person, place, and time. GCS eye subscore is 4. GCS verbal subscore is 5. GCS motor subscore is 6.   Skin: Skin is warm, dry and intact. No rash noted.   Psychiatric: She has a normal mood and affect. Her speech is normal and behavior is normal. Thought content normal.         ED Course    Procedures  Labs Reviewed   URINALYSIS, REFLEX TO URINE CULTURE - Abnormal; Notable for the following components:       Result Value    Occult Blood UA 1+ (*)     Nitrite, UA Positive (*)     Leukocytes, UA 1+ (*)     All other components within normal limits    Narrative:     Preferred Collection Type->Urine, Clean Catch   CBC W/ AUTO DIFFERENTIAL - Abnormal; Notable for the following components:    Hemoglobin 11.0 (*)     Hematocrit 35.1 (*)     MCV 76 (*)     MCH 23.9 (*)     MCHC 31.3 (*)     RDW 15.8 (*)     Platelets 396 (*)     All other components within normal limits   COMPREHENSIVE METABOLIC PANEL - Abnormal; Notable for the following components:    Sodium 133 (*)     All other components within normal limits   URINALYSIS MICROSCOPIC - Abnormal; Notable for the following components:    WBC, UA 30 (*)     Bacteria, UA Many (*)     All other components within normal limits    Narrative:     Preferred Collection Type->Urine, Clean Catch   CULTURE, URINE   LIPASE          Imaging Results          CT Abdomen Pelvis With Contrast (Final result)  Result time 03/09/19 17:58:09    Final result by Talib Lugo MD (03/09/19 17:58:09)                 Impression:      Hazy changes involving the pancreas along with intrahepatic biliary dilatation.  Correlation with amylase and lipase levels is suggested.  Follow-up with MRCP/ERCP is suggested.    Wall thickening involving the urinary bladder.  Suggest correlation with urinalysis.    Chronic anterior compression fractures involving the L2 vertebral body and the lower thoracic vertebral bodies.  Please see dedicated CT thoracic spine for further details.    Dysmorphic appearance of the right kidney with no discrete mass.    Additional findings as above.      Electronically signed by: Talib Lugo MD  Date:    03/09/2019  Time:    17:58             Narrative:    EXAMINATION:  CT ABDOMEN PELVIS WITH CONTRAST    CLINICAL HISTORY:  abdominal pain;    TECHNIQUE:  Low dose  axial images, sagittal and coronal reformations were obtained from the lung bases to the pubic symphysis following the IV administration of 100 mL of Omnipaque 350 .  Oral contrast was not given.    COMPARISON:  No direct comparison is available.    FINDINGS:  There are no pleural effusions.  There is no evidence of a pneumothorax.  No airspace opacity is identified.  No discrete pulmonary nodule is present.    The heart is unremarkable.  There is normal tapering of the abdominal aorta.  There are scattered calcifications along the course of the abdominal aorta.  The aortic branch vessels are within normal limits.  The portal veins and mesenteric vessels are within normal limits.  There is no evidence of lymphadenopathy in the abdomen or pelvis.    The esophagus, stomach, and duodenum are within normal limits.  The small bowel loops are unremarkable.  The appendix is not consistently identified.  There are no secondary findings of acute appendicitis.  There is scattered colonic diverticula without evidence of acute diverticulitis.    The liver is unremarkable.  The gallbladder is distended.  There is intrahepatic biliary dilatation.  The spleen is unremarkable.  There is hazy changes involving the pancreas.    The adrenal glands are unremarkable.  There is dysmorphic appearance of the right kidney.  There is a nonobstructing stone in the lower pole of the right kidney.  There is a 7 mm hypodensity in the upper pole of the left kidney, there is too small for complete characterization.  The ureters are within normal limits.  There is wall thickening involving the urinary bladder.  The patient appears status post hysterectomy.  The adnexal structures are within normal limits.    There is no evidence of free fluid in the abdomen or pelvis.  There is no evidence of free air.  There is no evidence of pneumatosis.  No portal venous air is identified.    The psoas margins are unremarkable.  The abdominal wall is within  normal limits.  There are degenerative changes in the osseous structures.  There are old compression fractures involving L2, T12, in the visualized lower thoracic vertebral bodies.  Please see dedicated CT scan of the thoracic spine for details.                               CT Thoracic Spine Without Contrast (Final result)     Abnormal  Result time 03/09/19 17:49:00    Final result by Talib Lugo MD (03/09/19 17:49:00)                 Impression:      No evidence of acute fracture or listhesis involving the thoracic spine.    Mild anterior wedge compression fracture of the T12 vertebral body corresponds to the abnormality identified on the preceding lumbar spine x-ray.  MRI of the thoracic spine may be attempted, if there is high clinical concern for acute fracture of the thoracic spine    Additional anterior compression deformities of the T7, T9, and the L2 thoracic vertebral bodies secondary to Schmorl's nodes.  No evidence of fracture component extending into the spinal canal.    Hazy changes involving the pancreas.  Suggest correlation with amylase and lipase levels.    Complex exophytic mass in the upper pole of the right kidney.  Outpatient CT urogram may be attempted for further evaluation    This report was flagged in Epic as abnormal.      Electronically signed by: Talib Lugo MD  Date:    03/09/2019  Time:    17:49             Narrative:    EXAMINATION:  CT THORACIC SPINE WITHOUT CONTRAST    CLINICAL HISTORY:  Abnormal xray, thoracic spine, DJD;    TECHNIQUE:  Axial CT scan of the thoracic spine was performed without intravenous contrast.  Coronal and sagittal reformats were obtained.    COMPARISON:  X-rays of the lumbar spine dated 03/09/2019.    FINDINGS:  There is minimal kyphosis of the thoracic spine at the level of T7.  The remainder of the thoracic alignment is within normal limits.    There are chronic anterior compression deformities involving the T7, T9, T12, and partially visualized L2  vertebral bodies.  The deformities involving the T7, T9, and L2 appear to be secondary to Schmorl's nodes.  There is no evidence of fracture component involving the posterior cortex.  There remainder of the vertebral body heights appear maintained.    There is hypertrophy of the posterior elements.  There is intervertebral disc space narrowing at multiple levels.  Evaluation of the individual disc levels reveals no evidence of significant spinal canal or neural foraminal narrowing.    There are emphysematous changes in the left lung apex.  No discrete pulmonary nodule is identified.  There is no evidence of a pneumothorax.  There is a nonobstructing stone in the upper pole of the right kidney.  There is overall haziness involving the right kidney.  There is complex exophytic mass in the upper pole of the right kidney.  There is also hazy changes involving the pancreas..                               X-Ray Pelvis Routine AP (Final result)  Result time 03/09/19 14:36:08    Final result by Shahid Jones MD (03/09/19 14:36:08)                 Impression:      1. No acute displaced fracture or dislocation of the pelvis noting possible remote injury of the left superior pubic ramus medially.      Electronically signed by: Shahid Jones MD  Date:    03/09/2019  Time:    14:36             Narrative:    EXAMINATION:  XR PELVIS ROUTINE AP    CLINICAL HISTORY:  Pain, unspecified    TECHNIQUE:  AP view of the pelvis was performed.    COMPARISON:  None.    FINDINGS:  Single-view.    The bilateral sacroiliac joints are intact.  The pubic symphysis is intact noting degenerative changes.  Degenerative changes are noted of the bilateral femoroacetabular joints, without dislocation.  There may be remote injury involving the superior pubic ramus on the left, medially.                               X-Ray Lumbar Spine Ap And Lateral (Final result)  Result time 03/09/19 14:39:24    Final result by Shahid Jones MD (03/09/19  14:39:24)                 Impression:      1. Anterior height loss involving T12 noting angulation of the anterior aspect of the vertebral body, acute fracture is not excluded, correlation with focal tenderness is recommended.  2. Height loss involving the superior endplate of L2 suggests that of Schmorl's node although correlation is advised.  3. Additional findings above.      Electronically signed by: Shahid Jones MD  Date:    03/09/2019  Time:    14:39             Narrative:    EXAMINATION:  XR LUMBAR SPINE AP AND LATERAL    CLINICAL HISTORY:  Low back pain, minor trauma;    TECHNIQUE:  AP, lateral and spot images were performed of the lumbar spine.    COMPARISON:  None    FINDINGS:  Three views.    Lateral imaging demonstrates adequate alignment of the lumbar spine without significant disc space height loss.  There is superior endplate height loss centrally involving L2, possibly on the basis of Schmorl's node.  Additionally, there is anterior wedging involving T12, with relatively sharp angulation of the anterior aspect of the vertebral body, acute fracture not excluded, correlation with any focal tenderness in the region is recommended.  Degenerative changes are noted of the lower lumbar facets.  The sacral segments appear grossly aligned.  AP spinal alignment is grossly unremarkable.  The sacroiliac joints are intact.                                 Medical Decision Making:   Initial Assessment:   63-year-old presents for worsening low back pain; symptoms began after lifting up her purpura keep it approximately 2 weeks ago.  She states pain is located to lower back and radiates to the bilateral sides and now to the abdomen.  She denies any other associated symptoms other than some occasional nausea.  She does report that she has prescription for pain medication however does not like to take these regularly and states that she only sees modest improvement in symptoms with Naprosyn.  Patient has diffuse  tenderness of the lower back was some bony and muscular tenderness no step-off or obvious bony deformity.  Generalized TTP of the abdomen with no rebound, rigidity or guarding; no CVA tenderness. Fair range of motion of all extremities with strength equal bilaterally.  Neurovascularly intact.  Differential Diagnosis:   Differential Diagnosis includes, but is not limited to:  Cauda equina syndrome, diskitis/osteomyelitis, epidural/paraspinal abscess, AAA, aortic dissection, post-op/hardware infection, trauma/vertebral fracture, spinal cord injury, disc herniation, spinal stenosis, sciatica, radiculopathy, neoplasm, lumbar muscle strain, muscle spasm, neuropathic pain, UTI/pyelonephritis, nephrolithiasis.    ED Management:  As patient does have worsening back pain with pain radiation to abdomen and with some abdominal tenderness on physical exam labs and lumbar spine were ordered.  Labs are consistent with nitrite positive UTI.  X-ray of lumbar spine does reveal anterior wedge deformity of the thoracic 12 region with uncertain chronicity.  We elected to evaluate further with CT of abdomen and thoracic spine which reveals compression fracture of T7, T9, T12 and L2 with no significant spinal cord compression patient with no findings to suggest cauda equina on physical exam.  Low suspicion of acute process as patient has had back pain for some time however do believe she would benefit from evaluation with [facility] and L OS brace.  Incidental findings of intrahepatic biliary duct dilation however patient with no elevation in lipase and no Peñaloza sign on physical exam.  Patient was ambulating on re-evaluation with no assistance and reported moderate improvement in pain. We did discuss pain medication and that she can use Norco however as she has not seen much improvement in this we elected to send with short course of Percocet with instructions on no concurrent use.  She does state that she sees a specific doctor who  writes this and she was instructed to call this doctor to let them know of new diagnosis and new prescription.  Patient will be started on Keflex for UTI with no suspicion of pyelonephritis at this time. Strict instructions to follow up with primary care physician or reference provided for further assessment and evaluation. Given instructions to return for any acute symptoms and verbalized understanding of this medical plan.                        Clinical Impression:       ICD-10-CM ICD-9-CM   1. Closed compression fracture of thoracic vertebra, initial encounter S22.000A 805.2   2. Pain R52 780.96   3. Closed compression fracture of second lumbar vertebra, initial encounter S32.020A 805.4   4. Urinary tract infection with hematuria, site unspecified N39.0 599.0    R31.9 599.70                                LUDWIG Hill  03/10/19 0034

## 2019-03-12 LAB — BACTERIA UR CULT: NORMAL

## 2019-08-29 DIAGNOSIS — I10 HYPERTENSION: ICD-10-CM

## 2019-08-29 DIAGNOSIS — N20.0 BILATERAL KIDNEY STONES: ICD-10-CM

## 2019-08-29 DIAGNOSIS — M81.0 OSTEOPOROSIS: ICD-10-CM

## 2019-08-29 DIAGNOSIS — E55.9 VITAMIN D DEFICIENCY: ICD-10-CM

## 2019-08-29 DIAGNOSIS — N28.9 ACUTE RENAL INSUFFICIENCY: Primary | ICD-10-CM

## 2019-08-29 DIAGNOSIS — N39.0 RECURRENT UTI: ICD-10-CM

## 2019-08-30 ENCOUNTER — HOSPITAL ENCOUNTER (OUTPATIENT)
Dept: RADIOLOGY | Facility: HOSPITAL | Age: 64
Discharge: HOME | End: 2019-08-30
Attending: INTERNAL MEDICINE
Payer: MEDICAID

## 2019-08-30 DIAGNOSIS — N39.0 RECURRENT UTI: ICD-10-CM

## 2019-08-30 DIAGNOSIS — E55.9 VITAMIN D DEFICIENCY: ICD-10-CM

## 2019-08-30 DIAGNOSIS — M81.0 OSTEOPOROSIS: ICD-10-CM

## 2019-08-30 DIAGNOSIS — N28.9 ACUTE RENAL INSUFFICIENCY: ICD-10-CM

## 2019-08-30 DIAGNOSIS — I10 HYPERTENSION: ICD-10-CM

## 2019-08-30 DIAGNOSIS — N20.0 BILATERAL KIDNEY STONES: ICD-10-CM

## 2019-08-30 PROCEDURE — 93975 US RENAL ARTERY STENOSIS HYPERTEN (XPD): ICD-10-PCS | Mod: 26,,, | Performed by: RADIOLOGY

## 2019-08-30 PROCEDURE — 93975 VASCULAR STUDY: CPT | Mod: 26,,, | Performed by: RADIOLOGY

## 2019-08-30 PROCEDURE — 93975 VASCULAR STUDY: CPT | Mod: TC

## 2019-11-11 ENCOUNTER — OFFICE VISIT (OUTPATIENT)
Dept: FAMILY MEDICINE | Facility: HOSPITAL | Age: 64
End: 2019-11-11
Attending: FAMILY MEDICINE
Payer: MEDICAID

## 2019-11-11 VITALS
DIASTOLIC BLOOD PRESSURE: 72 MMHG | HEIGHT: 61 IN | WEIGHT: 124.56 LBS | SYSTOLIC BLOOD PRESSURE: 112 MMHG | HEART RATE: 72 BPM | BODY MASS INDEX: 23.52 KG/M2

## 2019-11-11 DIAGNOSIS — M06.9 RHEUMATOID ARTHRITIS INVOLVING BOTH WRISTS, UNSPECIFIED RHEUMATOID FACTOR PRESENCE: Primary | ICD-10-CM

## 2019-11-11 DIAGNOSIS — G89.29 CHRONIC PAIN OF BOTH SHOULDERS: ICD-10-CM

## 2019-11-11 DIAGNOSIS — M25.511 CHRONIC PAIN OF BOTH SHOULDERS: ICD-10-CM

## 2019-11-11 DIAGNOSIS — M25.512 CHRONIC PAIN OF BOTH SHOULDERS: ICD-10-CM

## 2019-11-11 PROCEDURE — 99213 OFFICE O/P EST LOW 20 MIN: CPT | Performed by: STUDENT IN AN ORGANIZED HEALTH CARE EDUCATION/TRAINING PROGRAM

## 2019-11-11 RX ORDER — DULOXETIN HYDROCHLORIDE 30 MG/1
30 CAPSULE, DELAYED RELEASE ORAL DAILY
Qty: 30 CAPSULE | Refills: 1 | Status: SHIPPED | OUTPATIENT
Start: 2019-11-11 | End: 2019-11-11 | Stop reason: SDUPTHER

## 2019-11-11 RX ORDER — DULOXETIN HYDROCHLORIDE 30 MG/1
30 CAPSULE, DELAYED RELEASE ORAL DAILY
Qty: 30 CAPSULE | Refills: 11 | Status: SHIPPED | OUTPATIENT
Start: 2019-11-11 | End: 2020-02-10

## 2019-11-11 RX ORDER — HYDROXYZINE PAMOATE 25 MG/1
25 CAPSULE ORAL NIGHTLY PRN
COMMUNITY
End: 2020-02-10

## 2019-11-11 NOTE — PROGRESS NOTES
Subjective:       Patient ID: Shari Carpenter is a 64 y.o. female.    Chief Complaint: Arthritis    Ms. Carpenter is a 63yo F with PMH of osteoarthritis, osteoporosis, chronic migraine headaches, rheumatoid arthritis, and a recent kidney infection, who presents to clinic today complaining of bilateral shoulder pain. She has had chronic pain in both shoulders for many years. She had a traumatic fracture of her R humerus 2/2 to being knocked down by a heavy dog, which led to a shoulder replacement surgery and multiple screws placed in the bone. She has also had rotator cuff surgery on her L shoulder. She reports bilateral shoulder pain that limits her sleep, ADLs, and work as a . It is associated with radiating pain, numbness, stiffness, and swelling of both UEs. She has trouble brushing her hair, wiping after toileting, and sweeping/mopping. She also has bilateral wrist pain with characteristic rheumatoid nodules. Her pain has been chronically treated with Norco, naproxen, topical Voltaren gel, and a heating pad, but these are not covering her most severe pain adequately anymore. She has tried PT in the past. She denies dizziness, vision changes, CP, SOB, N/V, constipation, diarrhea, dysuria, polyuria, hematuria, hematochezia, and melena.    Review of Systems   Constitutional: Negative for activity change, appetite change, chills, fatigue, fever and unexpected weight change.   HENT: Negative for congestion, ear pain, hearing loss, rhinorrhea and sore throat.    Eyes: Negative for pain and visual disturbance.   Respiratory: Negative for cough, shortness of breath and wheezing.    Cardiovascular: Negative for chest pain and palpitations.   Gastrointestinal: Negative for abdominal pain, blood in stool, constipation, diarrhea, nausea and vomiting.   Endocrine: Negative for polyuria.   Genitourinary: Negative for dysuria, frequency, hematuria and urgency.   Musculoskeletal: Positive for arthralgias (bilateral  shoulder and wrist pain), back pain, joint swelling (swelling and nodules of both wrists), myalgias and neck pain. Negative for gait problem and neck stiffness.   Skin: Negative for color change and rash.   Neurological: Positive for numbness (of bilateral hands associated with shoulder pain) and headaches (chronic migraines, medication controlled). Negative for dizziness and weakness.   Hematological: Negative for adenopathy.   Psychiatric/Behavioral: Negative.        Objective:      Vitals:    11/11/19 1322   BP: 112/72   Pulse: 72     Physical Exam   Constitutional: She is oriented to person, place, and time. She appears well-developed and well-nourished. No distress.   HENT:   Head: Normocephalic and atraumatic.   Right Ear: External ear normal.   Left Ear: External ear normal.   Nose: Nose normal.   Mouth/Throat: Oropharynx is clear and moist. No oropharyngeal exudate.   Eyes: Pupils are equal, round, and reactive to light. Conjunctivae and EOM are normal. No scleral icterus.   Neck: Normal range of motion. Neck supple. No JVD present. No tracheal deviation present. No thyromegaly present.   Cardiovascular: Normal rate, regular rhythm and normal heart sounds.   Pulmonary/Chest: Effort normal and breath sounds normal. She has no wheezes. She exhibits no tenderness.   Abdominal: Soft. Bowel sounds are normal. She exhibits no distension.   Musculoskeletal: Normal range of motion. She exhibits no edema or tenderness.   Shoulders diffusely TTP bilaterally. Pain with abduction and empty can test.   Lymphadenopathy:     She has no cervical adenopathy.   Neurological: She is alert and oriented to person, place, and time. No cranial nerve deficit or sensory deficit. Coordination normal.   Skin: Skin is warm and dry. Capillary refill takes less than 2 seconds. She is not diaphoretic. There is erythema. No pallor.   Psychiatric: She has a normal mood and affect. Her behavior is normal.       Assessment:       1.  Rheumatoid arthritis involving both wrists, unspecified rheumatoid factor presence    2. Chronic pain of both shoulders        Plan:       Rheumatoid arthritis involving both wrists, unspecified rheumatoid factor presence  -     Ambulatory referral to Rheumatology    Chronic pain of both shoulders  -     Ambulatory Referral to Physical/Occupational Therapy  -     DULoxetine (CYMBALTA) 30 MG capsule; Take 1 capsule (30 mg total) by mouth once daily.  Dispense: 30 capsule; Refill: 11      Follow up in about 4 weeks (around 12/9/2019).

## 2019-11-25 ENCOUNTER — CLINICAL SUPPORT (OUTPATIENT)
Dept: REHABILITATION | Facility: HOSPITAL | Age: 64
End: 2019-11-25
Payer: MEDICAID

## 2019-11-25 DIAGNOSIS — M79.601 PAIN IN BOTH UPPER EXTREMITIES: Primary | ICD-10-CM

## 2019-11-25 DIAGNOSIS — M79.602 PAIN IN BOTH UPPER EXTREMITIES: Primary | ICD-10-CM

## 2019-11-25 DIAGNOSIS — R53.1 WEAKNESS: ICD-10-CM

## 2019-11-25 DIAGNOSIS — Z79.891 CHRONIC PRESCRIPTION OPIATE USE: ICD-10-CM

## 2019-11-25 PROCEDURE — 97165 OT EVAL LOW COMPLEX 30 MIN: CPT | Mod: PN

## 2019-11-25 NOTE — PLAN OF CARE
[facility] Occupational Therapy  Initial Evaluation     Date: 11/25/2019  Patient: Shari Carpenter  Chart Number: [id number]    Therapy Diagnosis:   Encounter Diagnoses   Name Primary?    Pain in both upper extremities Yes    Chronic prescription opiate use     Weakness      Physician: Lilia Woodard,*    Physician Orders: OT nik and dior  Medical Diagnosis: M25.511,G89.29,M25.512 (ICD-10-CM) - Chronic pain of both shoulders  Evaluation Date: 11/25/2019  Insurance Authorization period Expiration: 12/31/2019  Plan of Care Expiration Period: 1/24/2020  Next MD appointment: 12/27/2019    Visit # / Visits Authorized: 1 / 1  Time In:300  Time Out: 330  Total Billable Time: 30 minutes  LCE1  Precautions: Standard     Subjective     Involved Side: Bilateral   Dominant Side: Right  Date of Onset: chronic  Mechanism of Injury: history of fracture with surgery on the right and RCT on left  History of Current Condition: pt presents to clinic today with decreased ROM, weakness and pain all of which limit her functionally  Surgical Procedure: ORIF on right in 2017 and RCR in 2014 on Left  Imaging: none recently  Previous Therapy: rec'd therapy for both shoulders after previous surgeries    Patient's Goals for Therapy: to decrease pain and increase functional use     Pain:  Functional Pain Scale Rating 0-10:   6/10 on average  4/10 at best  8/10 at worst  Location: neck into elbow on left side, shoulder blade left side, lateral arm on right  Description: Aching  Aggravating Factors: Night Time and Lifting  Easing Factors: pain medication, ice and heating pad    Occupation:  On disability    Functional Limitations/Social History:    Previous functional status includes: Independent with all ADLs.     Current FunctionalStatus   Home/Living environment : lives alone      Limitation of Functional Status as follows:   ADLs/IADLs:     - Feeding: Independent    - Bathing: Independent    - Dressing/Grooming:  Independent    - Driving: Independent     Leisure: cooking, cleaning      Past Medical History/Physical Systems Review:   Shari Carpenter  has a past medical history of Arthritis, Migraine headache, and Osteoporosis.    Shari Carpenter  has a past surgical history that includes Shoulder surgery (Right).    Shari CARLSON has a current medication list which includes the following prescription(s): alprazolam, diclofenac sodium, duloxetine, gabapentin, hydroxyzine pamoate, naproxen, ondansetron, oxycodone-acetaminophen, and sumatriptan.    Review of patient's allergies indicates:  No Known Allergies       Objective     Sensation Test: experiences tingling experiences numbness in both hands left greater than right    Observation/Inspection:  forward head    Range of Motion/Strength:   Shoulder  Left   Right  Pain/Dysfunction with Movement    AROM PROM MMT AROM PROM MMT    Flexion 160 WNL 4/5 155 WNL 4/5    Extension 50 WNL 4/5 55 WNL 4/5    Abduction 160 WNL 4-/5 160 WNL 4/5    HorizAdduction 20 WNL 4/5 35 WNL 4/5    Internal rotation T12 WNL 4/5 L4 WNL 4/5    ER at 90° abd 70 WNL NT 80 WNL 4/5    ER at 0° abd 60 WNL 4-/5 50 WNL 4/5    NT=Not tested  ROM Comments: Pain at end range    Painful Arc: none noted    Tenderness upon Palpation:      Positive: Bicipital Groove on left greater than right    Special Tests:  AC Joint Left Right   Empty Can Test - -   Hawkin's Kenndy + -   Neer's Test + -     Scapular Control/Dyskinesis:    Normal / Subtle / Obvious  Comments    Left  subtle -    Right  subtle -       CMS Impairment/Limitation/Restriction for FOTO Shoulder Survey    Therapist reviewed FOTO scores for Shari Carpenter on 11/25/2019.   FOTO documents entered into [employer] - see Media section.    Limitation Score: 68%  Category: Self Care    Current : CL = least 60% but < 80% impaired, limited or restricted  Goal: CI = at least 1% but < 20% impaired, limited or restricted         Treatment     Home Exercise Program/Education:  Issued  HEP (see patient instructions in EMR) and educated on modality use for pain management . Exercises were reviewed and Shari was able to demonstrate them prior to the end of the session.   Pt received a written copy of exercises to perform at home. Shari demonstrated good  understanding of the education provided.  Pt was advised to perform these exercises free of pain, and to stop performing them if pain occurs.    Patient/Family Education: role of OT, goals for OT, scheduling/cancellations - pt verbalized understanding. Discussed insurance limitations with patient.    Assessment     hSari Carpenter is a 64 y.o. female referred to outpatient occupational therapy and presents with a medical diagnosis of Bilateral shoulder pain, resulting in Decreased ROM, Decreased muscle strength and Increased pain and demonstrates limitations as described in the chart below. Following medical record review it is determined that pt will benefit from occupational therapy services in order to maximize pain free and/or functional use of bilateral shoulders. The following goals were discussed with the patient and patient is in agreement with them as to be addressed in the treatment plan. The patient's rehab potential is Good.     Anticipated barriers to occupational therapy: none  Pt has no cultural, educational or language barriers to learning provided.    Profile and History Assessment of Occupational Performance Level of Clinical Decision Making Complexity Score   Occupational Profile:   Shari Carpenter is a 64 y.o. female who lives alone and is currently disability. Shari Carpenter has difficulty with  housework/household chores  affecting his/her daily functional abilities. His/her main goal for therapy is to decrease pain and increase functional use.     Comorbidities:   arthritis    Medical and Therapy History Review:   Expanded               Performance Deficits    Physical:  Joint Mobility  Joint Stability  Muscle Power/Strength  Muscle  Endurance  Muscle Tone  Postural Control  Pain    Cognitive:  No Deficits    Psychosocial:    No Deficits     Clinical Decision Making:  low    Assessment Process:  Problem-Focused Assessments    Modification/Need for Assistance:  Not Necessary    Intervention Selection:  Several Treatment Options       low  Based on PMHX, co morbidities , data from assessments and functional level of assistance required with task and clinical presentation directly impacting function.       The following goals were discussed with the patient and patient is in agreement with them as to be addressed in the treatment plan.     Goals:     Long Term Goals to be met by discharge:  1) Independent with HEP  2) Pt will demonstrate (Bilateral) shoulder AROM WNL grossly for [place] with ADL's  3) Pt will demonstrate (Bilateral) shoulder MMT WNL grossly for [place] with functional activities  4) Independent and pain free with ADL's and IADL's  5) Patient will be able to achieve less than or equal to 15% on FOTO shoulder survey demonstrating overall improved functional ability with upper extremity.       Plan   Certification Period/Plan of care expiration: 11/25/2019 to 1/24/2020.    Outpatient Occupational Therapy 2 times weekly for 8 weeks to include the following interventions: Manual therapy/joint mobilizations, Modalities for pain management, Therapeutic exercises/activities., Strengthening, Joint Protection and Energy Conservation.      SAL Mason

## 2019-11-26 PROBLEM — Z74.09 STIFFNESS DUE TO IMMOBILITY: Status: ACTIVE | Noted: 2017-11-22

## 2019-11-26 PROBLEM — M79.602 PAIN IN BOTH UPPER EXTREMITIES: Status: ACTIVE | Noted: 2017-11-22

## 2019-12-03 ENCOUNTER — TELEPHONE (OUTPATIENT)
Dept: REHABILITATION | Facility: HOSPITAL | Age: 64
End: 2019-12-03

## 2019-12-16 NOTE — PROGRESS NOTES
Subjective:       Patient ID: Shari Carpenter is a 64 y.o. female.    Chief Complaint: Carpal tunnel f/u    Ms. Carpenter is a 63yo F who presents to clinic for follow-up of carpal tunnel.  She has been seen in rheumatology clinic, low suspicion of RA.  Work-up and labs initiated.  She reports ongoing numbness and shooting pain into bilateral hands.  Wishes for steroid injection- has discussed with rheumatology.    Follows in pain management clinic for shoulder pain.  Has been to PT and OT.  Started on cymbalta at last appt with myself.  Reports minimal improvement.      Review of Systems   Constitutional: Negative for appetite change and fever.   HENT: Negative for congestion and sore throat.    Respiratory: Negative for cough, chest tightness and shortness of breath.    Cardiovascular: Negative for chest pain and palpitations.   Gastrointestinal: Negative for abdominal pain, diarrhea and nausea.   Musculoskeletal: Positive for arthralgias, gait problem, myalgias and neck pain.   Neurological: Positive for dizziness and weakness. Negative for headaches.       Objective:      Vitals:    12/17/19 0859   BP: (!) 142/78   Pulse: 63     Physical Exam   Constitutional: She is oriented to person, place, and time. She appears well-developed and well-nourished. No distress.   HENT:   Head: Normocephalic and atraumatic.   Eyes: Pupils are equal, round, and reactive to light.   Cardiovascular: Normal rate, regular rhythm and normal heart sounds.   No murmur heard.  Pulmonary/Chest: Effort normal. No respiratory distress.   Abdominal: Soft. She exhibits no distension.   Musculoskeletal:   tinel's and phalen's test positive bilaterally   Neurological: She is alert and oriented to person, place, and time.   Skin: She is not diaphoretic.   Nursing note and vitals reviewed.      Assessment:       1. Bilateral carpal tunnel syndrome        Plan:       Bilateral carpal tunnel syndrome  -     Ambulatory Referral to  Physical/Occupational Therapy  -     predniSONE (DELTASONE) 20 MG tablet; Take 2 tablets (40 mg total) by mouth once daily.  Dispense: 10 tablet; Refill: 0  -     Discussed use of bilateral wrist splints at night for 6-8 weeks.  Can wear during day if provides better relief.       Follow up in about 8 weeks (around 2/11/2020).

## 2019-12-17 ENCOUNTER — OFFICE VISIT (OUTPATIENT)
Dept: FAMILY MEDICINE | Facility: HOSPITAL | Age: 64
End: 2019-12-17
Attending: FAMILY MEDICINE
Payer: MEDICAID

## 2019-12-17 VITALS
SYSTOLIC BLOOD PRESSURE: 142 MMHG | WEIGHT: 124 LBS | HEIGHT: 61 IN | BODY MASS INDEX: 23.41 KG/M2 | HEART RATE: 63 BPM | DIASTOLIC BLOOD PRESSURE: 78 MMHG

## 2019-12-17 DIAGNOSIS — G56.03 BILATERAL CARPAL TUNNEL SYNDROME: Primary | ICD-10-CM

## 2019-12-17 PROCEDURE — 99214 OFFICE O/P EST MOD 30 MIN: CPT | Performed by: STUDENT IN AN ORGANIZED HEALTH CARE EDUCATION/TRAINING PROGRAM

## 2019-12-17 RX ORDER — ERGOCALCIFEROL 1.25 MG/1
CAPSULE ORAL
COMMUNITY
Start: 2019-11-20 | End: 2021-10-14

## 2019-12-17 RX ORDER — TIZANIDINE 4 MG/1
4 TABLET ORAL 2 TIMES DAILY
Refills: 2 | COMMUNITY
Start: 2019-12-03 | End: 2020-02-10

## 2019-12-17 RX ORDER — HYDROCODONE BITARTRATE AND ACETAMINOPHEN 10; 325 MG/1; MG/1
TABLET ORAL
COMMUNITY
Start: 2019-11-16 | End: 2021-10-14 | Stop reason: DRUGHIGH

## 2019-12-17 RX ORDER — CALCIUM CARBONATE/VITAMIN D3 500 MG-10
1 TABLET,CHEWABLE ORAL DAILY
COMMUNITY
Start: 2019-11-18

## 2019-12-17 RX ORDER — PREDNISONE 20 MG/1
40 TABLET ORAL DAILY
Qty: 10 TABLET | Refills: 0 | Status: ON HOLD | OUTPATIENT
Start: 2019-12-17 | End: 2020-02-01 | Stop reason: HOSPADM

## 2019-12-17 RX ORDER — ERGOCALCIFEROL 1.25 MG/1
CAPSULE ORAL
Refills: 0 | COMMUNITY
Start: 2019-12-14 | End: 2021-10-14

## 2019-12-18 NOTE — PROGRESS NOTES
I have reviewed the notes, assessments, and/or procedures performed, I concur with her/his documentation of Shari Carpenter.

## 2020-01-03 ENCOUNTER — CLINICAL SUPPORT (OUTPATIENT)
Dept: REHABILITATION | Facility: HOSPITAL | Age: 65
End: 2020-01-03
Payer: MEDICAID

## 2020-01-03 DIAGNOSIS — R20.2 NUMBNESS AND TINGLING IN BOTH HANDS: ICD-10-CM

## 2020-01-03 DIAGNOSIS — M25.532 PAIN IN BOTH WRISTS: ICD-10-CM

## 2020-01-03 DIAGNOSIS — R20.0 NUMBNESS AND TINGLING IN BOTH HANDS: ICD-10-CM

## 2020-01-03 DIAGNOSIS — M25.531 PAIN IN BOTH WRISTS: ICD-10-CM

## 2020-01-03 PROCEDURE — 97166 OT EVAL MOD COMPLEX 45 MIN: CPT | Mod: PN

## 2020-01-03 NOTE — PLAN OF CARE
[facility] Occupational Therapy  Initial Evaluation     Date: 1/3/2020  Name: Shari Carpenter  Clinic Number: [id number]    Therapy Diagnosis:   Encounter Diagnoses   Name Primary?    Pain in both wrists     Numbness and tingling in both hands      Physician: Lilia Woodard,*    Physician Orders: eval and treat  Medical Diagnosis: Bilateral CTS  Surgical Procedure and Date: n/a/ Date of Injury/Onset: ~6 months ago  Evaluation Date: 1/3/2020  Insurance Authorization Period Expiration: 12/31/2020  Plan of Care Certification Period: 2/14/2020  Date of Return to MD: 2/10/2020    Visit # / Visits authorized: 1 / 1    FOTO: initial eval    Time In: 9:20 am  Time Out: 10:05 am  Total treatment time: 45 minutes  Total Timed minutes: 0 minutes    Precautions:  Standard    Subjective     Involved Side: bilateral  Dominant Side: Right  Date of Onset: ~6 mo ago  Mechanism of Injury: insidious onset  History of Current Condition:  Pt reports has been wearing wrist braces at night. Pt reports some improvement with pain medicine. Pt does have hx of past fx and surgery in R wrist and in R shoulder. Pt feels limited with ADLs, house hold activities, and holding things in her hands. Pt reports she has not had an EMG on her UEs. Pt has hx of chronic pain.  Surgical Procedure: n/a  Imaging: none available for wrists  Previous Therapy: pt has had prior therapy on B shoulders    Past Medical History/Physical Systems Review:   Shari Carpenter  has a past medical history of Arthritis, Migraine headache, and Osteoporosis.    Shari Carpenter  has a past surgical history that includes Shoulder surgery (Right).    Shari CARLSON has a current medication list which includes the following prescription(s): alprazolam, calcium carbonate-vitamin d3, diclofenac sodium, duloxetine, ergocalciferol, gabapentin, hydrocodone-acetaminophen, hydroxyzine pamoate, naproxen, ondansetron, oxycodone-acetaminophen, prednisone, sumatriptan,  "tizanidine, and vitamin d2.    Review of patient's allergies indicates:  No Known Allergies     Patient's Goals for Therapy: to have less pain    Pt reports deficits in R thumb and IF for ~1 year. Reports difficulty holding onto objects, and dropping them. Pt states "I can't hold onto things. I keep dropping things. This has been going on for a long time."  Pain:  Functional Pain Scale Rating 0-10:   7/10 on average  6/10 at best  8/10 at worst  Location: B forearms and elbow, wrists and hand  Description: Burning, Electric and Shooting  Aggravating Factors: Flexing and gripping  Easing Factors: pain medication    Occupation:    Working presently: unemployed  Duties: n/a    Functional Limitations/Social History:    Previous functional status includes: Independent with all ADLs. But with some difficulty with grooming hair due to shoulder deficits    Current FunctionalStatus   Home/Living environment : lives alone      Limitation of Functional Status as follows:   ADLs/IADLs:     - Feeding: I but with difficulty     - Bathing: mod I, with difficutly    - Dressing/Grooming: mod I with pain and difficulty, limited with zipping and buttoning    - Driving: I, but with difficulty    -mod I for opening jars, difficulty with turning keys     Leisure: did not report        Objective     Observation/Appearance: mild swelling in L hand; limited flex of R IF, and thumb, R thumb MP flex with IP hyperextended. No active flexion of thumb IP, concern for possible rupture of R FPL in the past. Pt reports thumb issue for ~1 year. Mild atrophy noted in R palm and thenar. Scarring noted at dorsal hand and volar wrist from past injuries.    Edema. Measured in centimeters.   1/3/2020 1/3/2020    Left Right   Wrist Crease 16.9 16.9   DPC 19.3 19   MCPs 19.6 19.2       Elbow and Wrist ROM. Measured in degrees.   1/3/2020 1/3/2020    Left Right   Elbow Ext/Flex WNL WNL   Supination/Pronation WNL WNL   Wrist Ext/Flex 68/50 68/40   Wrist RD/UD " 24/24 32/10     Hand ROM. Measured in degrees.  L hand WNL  Other R hand digits WNL   1/3/2020    Right       Index: MP  82              PIP     80              DIP 8              SERRANO 170       Long:  MP WNL              PIP WNL              DIP WNL              SERRANO WNL       Ring:   MP WNL              PIP WNL              DIP WNL              SERRANO WNL       Small:  MP WNL               PIP WNL               DIP WNL              SERRANO WNL       Thumb: MP 10/62                IP +30/+30       Rad ADD/ABD 50       Pal ADD/ABD 50          Opposition Able to bring thumb to DPC, but does not have active flex of thumb IP      Strength (Dynamometer) and Pinch Strength (Pinch Gauge)  Measured in pounds.   1/3/2020 1/3/2020    Left Right   Rung II 37 24   Key Pinch 11 11   3pt Pinch 11 5   2pt Pinch 5 2     Sensation: reports constant numbness to B hands and decreased sensation to R hand. Light touch impaired      Special Tests  Phalen's Test (+) on b hands   Tinel's Test (-) on B wrists       CMS Impairment/Limitation/Restriction for FOTO Hand Survey    Therapist reviewed FOTO scores for Shari Carpenter on 1/3/2020.   FOTO documents entered into [employer] - see Media section.    Limitation Score: 64%         Treatment     Treatment Time In: 9:50 am  Treatment Time Out: 10:05 am  Total Treatment time separate from Evaluation time:15 min    Shari received the following supervised modalities after being cleared for contradictions for 5 minutes:   -Patient received MH x 5 min to B hands to increase blood flow, circulation and tissue elasticity prior to therex    Shari received therapeutic exercises for 10 minutes including: bilaterally  -  Median nerve glides X 5 reps    Carpal tunnel decompression stretches X 2 sets       Home Exercise Program/Education:  Issued HEP (see patient instructions in EMR) and educated on modality use for pain management . Exercises were reviewed and Shari was able to demonstrate them prior to the end of the  session.   Pt received a written copy of exercises to perform at home. Shari demonstrated good  understanding of the education provided.  Pt was advised to perform these exercises free of pain, and to stop performing them if pain occurs.    Patient/Family Education: role of OT, goals for OT, scheduling/cancellations - pt verbalized understanding. Discussed insurance limitations with patient.    Additional Education provided: discussed nightly brace wear    Assessment     Shari Carpenter is a 64 y.o. female referred to outpatient occupational therapy and presents with a medical diagnosis of B CTS, resulting in pain, stiffness, weakness, swelling, and decreased functional use of B hands,  and demonstrates limitations as described in the chart below. Following medical record review it is determined that pt will benefit from occupational therapy services in order to maximize pain free and/or functional use of bilateral hands. The following goals were discussed with the patient and patient is in agreement with them as to be addressed in the treatment plan. The patient's rehab potential is Fair.     Anticipated barriers to occupational therapy: chronic nature of dx, past injury and surgeries to R wrist and shoulder, impaired sensation of hands  Pt has no cultural, educational or language barriers to learning provided.    Profile and History Assessment of Occupational Performance Level of Clinical Decision Making Complexity Score   Occupational Profile:   Shari Carpenter is a 64 y.o. female who lives alone and is currently unemployed. Shari Carpenter has difficulty with  bathing, grooming and dressing  driving/transportation management, phone/computer use and housework/household chores  affecting his/her daily functional abilities. His/her main goal for therapy is to have less pain.     Comorbidities:   level of undertstanding of current condition and prior surgeries to R wrist and shoulder, chronic pain    Medical and Therapy  History Review:   Expanded               Performance Deficits    Physical:  Joint Mobility  Muscle Power/Strength  Muscle Endurance   Strength  Pinch Strength  Fine Motor Coordination  Muscle Tone  Pain    Cognitive:  No Deficits    Psychosocial:    Habits  Routines     Clinical Decision Making:  moderate    Assessment Process:  Detailed Assessments    Modification/Need for Assistance:  Minimal-Moderate Modifications/Assistance    Intervention Selection:  Limited Treatment Options       moderate  Based on PMHX, co morbidities , data from assessments and functional level of assistance required with task and clinical presentation directly impacting function.         Goals:   The following goals were discussed with the patient and patient is in agreement with them as to be addressed in the treatment plan.   Long Term Goals (LTGs); to be met by discharge.  LTG #1: Pt will report a pain level of 2 out of 10 with daily tasks, including opening jars and turning keys   LTG #2: Pt will demo improved FOTO score by at least 20 points.   LTG #3: Pt will return to prior level of function for ADLs and household management.   LTG #4: Pt will demonstrate improved B  strength by at least 3-5# for improved grasp of objects.     Short Term Goals (STGs); to be met within 4 weeks (2/3/2020).  STG #1a: Pt will report 4 out of 10 pain level with ADLs and other daily tasks.  STG #2: Pt will demonstrate independence with issued HEP.           Plan   Certification Period/Plan of care expiration: 1/3/2020 to 2/14/2020.    Outpatient Occupational Therapy 1 times weekly for 6 weeks to include the following interventions: Paraffin, Fluidotherapy, Manual therapy/joint mobilizations, Modalities for pain management, US 3 mhz, Therapeutic exercises/activities., Strengthening, Electrical Modalities, Joint Protection and Energy Conservation.      SAL Garay  Date: 1/3/2020      I certify the need for these services furnished under  the plan of treatment and while under my care.     ____________________________________________________________________  Physician/Referring Practitioner   Date of Signature

## 2020-01-03 NOTE — PATIENT INSTRUCTIONS
MEDIAN NERVE GLIDING    Complete 10 repetitions of each exercise 4-6 times a day.      1. Make a fist, keep the wrist straight.  2. Straighten the fingers, keep the wrist straight.  3. Gently bend the wrist back, keep the thumb close to the fingers.  4. Extend the thumb out.  5. Turn forearm so palm is facing up.  6. Gently stretch the thumb out using the other hand.    If any of these exercises aggravate your hands, stop, rest and try returning again to the previous exercise performed without pain. Proceed at your own pace using pain tolerance as your guide.

## 2020-01-17 ENCOUNTER — CLINICAL SUPPORT (OUTPATIENT)
Dept: REHABILITATION | Facility: HOSPITAL | Age: 65
End: 2020-01-17
Payer: MEDICAID

## 2020-01-17 DIAGNOSIS — M25.531 PAIN IN BOTH WRISTS: ICD-10-CM

## 2020-01-17 DIAGNOSIS — R20.2 NUMBNESS AND TINGLING IN BOTH HANDS: ICD-10-CM

## 2020-01-17 DIAGNOSIS — R20.0 NUMBNESS AND TINGLING IN BOTH HANDS: ICD-10-CM

## 2020-01-17 DIAGNOSIS — M25.532 PAIN IN BOTH WRISTS: ICD-10-CM

## 2020-01-17 PROCEDURE — 97530 THERAPEUTIC ACTIVITIES: CPT | Mod: PN

## 2020-01-17 NOTE — PROGRESS NOTES
"  Occupational Therapy Daily Treatment Note      Date: 1/17/2020  Name: Shari Carpenter  Clinic Number: [id number]    Therapy Diagnosis:   Encounter Diagnoses   Name Primary?    Pain in both wrists     Numbness and tingling in both hands      Physician: Conchita Florian PA    Physician Orders: eval and treat  Medical Diagnosis: Bilateral CTS  Surgical Procedure and Date: n/a/ Date of Injury/Onset: ~6 months ago  Evaluation Date: 1/3/2020  Insurance Authorization Period Expiration: 2/28/2020  Plan of Care Certification Period: 2/14/2020  Date of Return to MD: 2/10/2020    FOTO: initial eval      Visit # / Visits authorized: 1 / 6  Time In: 11:15 am  Time Out: 11:55 am  Total Treatment Time: 40 minutes  Total Timed minutes: 30 minutes    Precautions:  Standard      Subjective     Pt reports: "i'm hurting.   she was partially compliant with home exercise program given last session.   Response to previous treatment: cont with pain  Functional change: pt cont with c/o pain and reports functional limitations    Pain: 7/10  Location: bilateral wrists, but R>L    Objective     Treatment consisted of the following:    Shari received the following supervised modalities after being cleared for contradictions for 10 minutes:   Patient received paraffin bath with MHP to B hand(s) for 10 minutes to increase blood flow, circulation, pain management and for tissue elasticity prior to therex.     Shari received therapeutic exercises for 30 minutes including: bilaterally  -  AROM  Wrist flex/ext, RD/UD X 10 reps each   Tendon glides X 10 reps each   Median nerve glides X 5 reps    Carpal tunnel decompression stretches, positions 1-6 X 1 sets   Isometric "C's" X 10 reps, with 10 sec hold          Home Exercises and Education Provided     Education provided: cont HEP, reviewed brace wear  - Progress towards goals     Written Home Exercises Provided: Patient instructed to cont prior HEP.  Exercises were reviewed and Shari was able to " demonstrate them prior to the end of the session.  Shari demonstrated good  understanding of the education provided.   .   See EMR under Patient Instructions for exercises provided initial eval.     Assessment     Pt arrived 15 min late today. Pt tolerated tx fairly today. She cont to present with mild swelling of R hand/wrist, thumb CMC pain, wrist pain. She c/o high pain level today, reporting pain in multiple areas. Pt cont to report pain with activities, and no change since eval. She participated fairly well and reports compliance with brae wear and HEP.     Shari is progressing fairly towards her goals and there are no updates to goals at this time. Pt prognosis continues as Fair. Pt will continue to benefit from skilled outpatient occupational therapy to address the deficits listed in the problem list on initial evaluation, provide pt/family education and to maximize pt's level of independence in the home and community environment.     Anticipated barriers to continued occupational therapy: other comorbidities, chronic pain, chronic nature of dx    Pt's spiritual, cultural and educational needs considered and pt agreeable to plan of care and goals.    Goals     Goals:   The following goals were discussed with the patient and patient is in agreement with them as to be addressed in the treatment plan.   Long Term Goals (LTGs); to be met by discharge.  LTG #1: Pt will report a pain level of 2 out of 10 with daily tasks, including opening jars and turning keys --progressing      LTG #2: Pt will demo improved FOTO score by at least 20 points. --progressing   LTG #3: Pt will return to prior level of function for ADLs and household management. --progressing   LTG #4: Pt will demonstrate improved B  strength by at least 3-5# for improved grasp of objects. --progressing      Short Term Goals (STGs); to be met within 4 weeks (2/3/2020).  STG #1a: Pt will report 4 out of 10 pain level with ADLs and other daily  tasks.--progressing   STG #2: Pt will demonstrate independence with issued HEP. --progressing          Plan     Continue skilled occupational therapy with individualized plan of care focusing on pain and nerve glides to maximize functional use of hands.      Updates/Grading for next session: cont to progress as able    Cristina Alexander OTR/L

## 2020-01-30 ENCOUNTER — HOSPITAL ENCOUNTER (OUTPATIENT)
Facility: HOSPITAL | Age: 65
Discharge: HOME | End: 2020-02-01
Attending: EMERGENCY MEDICINE | Admitting: FAMILY MEDICINE
Payer: MEDICAID

## 2020-01-30 DIAGNOSIS — N28.9 ACUTE KIDNEY INSUFFICIENCY: ICD-10-CM

## 2020-01-30 DIAGNOSIS — N28.9 RENAL INSUFFICIENCY: ICD-10-CM

## 2020-01-30 DIAGNOSIS — R11.0 NAUSEA: ICD-10-CM

## 2020-01-30 DIAGNOSIS — R06.02 SHORTNESS OF BREATH: ICD-10-CM

## 2020-01-30 DIAGNOSIS — N30.90 CYSTITIS: Primary | ICD-10-CM

## 2020-01-30 DIAGNOSIS — R10.9 ACUTE ABDOMINAL PAIN: ICD-10-CM

## 2020-01-30 DIAGNOSIS — R74.01 TRANSAMINITIS: ICD-10-CM

## 2020-01-30 LAB
ALBUMIN SERPL BCP-MCNC: 2.8 G/DL (ref 3.5–5.2)
ALP SERPL-CCNC: 501 U/L (ref 55–135)
ALT SERPL W/O P-5'-P-CCNC: 27 U/L (ref 10–44)
ANION GAP SERPL CALC-SCNC: 8 MMOL/L (ref 8–16)
ANISOCYTOSIS BLD QL SMEAR: SLIGHT
AST SERPL-CCNC: 59 U/L (ref 10–40)
BACTERIA #/AREA URNS HPF: ABNORMAL /HPF
BASOPHILS # BLD AUTO: 0.02 K/UL (ref 0–0.2)
BASOPHILS NFR BLD: 0.1 % (ref 0–1.9)
BILIRUB SERPL-MCNC: 1.9 MG/DL (ref 0.1–1)
BILIRUB UR QL STRIP: ABNORMAL
BUN SERPL-MCNC: 29 MG/DL (ref 8–23)
CALCIUM SERPL-MCNC: 9.4 MG/DL (ref 8.7–10.5)
CHLORIDE SERPL-SCNC: 103 MMOL/L (ref 95–110)
CLARITY UR: ABNORMAL
CO2 SERPL-SCNC: 23 MMOL/L (ref 23–29)
COLOR UR: YELLOW
CREAT SERPL-MCNC: 1.8 MG/DL (ref 0.5–1.4)
DIFFERENTIAL METHOD: ABNORMAL
EOSINOPHIL # BLD AUTO: 0 K/UL (ref 0–0.5)
EOSINOPHIL NFR BLD: 0.1 % (ref 0–8)
ERYTHROCYTE [DISTWIDTH] IN BLOOD BY AUTOMATED COUNT: 17 % (ref 11.5–14.5)
EST. GFR  (AFRICAN AMERICAN): 34 ML/MIN/1.73 M^2
EST. GFR  (NON AFRICAN AMERICAN): 30 ML/MIN/1.73 M^2
GLUCOSE SERPL-MCNC: 82 MG/DL (ref 70–110)
GLUCOSE UR QL STRIP: NEGATIVE
GRAN CASTS #/AREA URNS LPF: 3 /LPF
HCT VFR BLD AUTO: 28.4 % (ref 37–48.5)
HGB BLD-MCNC: 9.6 G/DL (ref 12–16)
HGB UR QL STRIP: ABNORMAL
HYALINE CASTS #/AREA URNS LPF: 0 /LPF
HYPOCHROMIA BLD QL SMEAR: ABNORMAL
KETONES UR QL STRIP: NEGATIVE
LACTATE SERPL-SCNC: 1.7 MMOL/L (ref 0.5–2.2)
LEUKOCYTE ESTERASE UR QL STRIP: ABNORMAL
LIPASE SERPL-CCNC: 6 U/L (ref 4–60)
LYMPHOCYTES # BLD AUTO: 1.1 K/UL (ref 1–4.8)
LYMPHOCYTES NFR BLD: 4 % (ref 18–48)
MCH RBC QN AUTO: 23.9 PG (ref 27–31)
MCHC RBC AUTO-ENTMCNC: 33.8 G/DL (ref 32–36)
MCV RBC AUTO: 71 FL (ref 82–98)
MICROSCOPIC COMMENT: ABNORMAL
MONOCYTES # BLD AUTO: 3.3 K/UL (ref 0.3–1)
MONOCYTES NFR BLD: 11.9 % (ref 4–15)
NEUTROPHILS # BLD AUTO: 23.3 K/UL (ref 1.8–7.7)
NEUTROPHILS NFR BLD: 83.9 % (ref 38–73)
NITRITE UR QL STRIP: NEGATIVE
PH UR STRIP: 6 [PH] (ref 5–8)
PLATELET # BLD AUTO: 331 K/UL (ref 150–350)
PLATELET BLD QL SMEAR: ABNORMAL
PMV BLD AUTO: 10.4 FL (ref 9.2–12.9)
POTASSIUM SERPL-SCNC: 4.2 MMOL/L (ref 3.5–5.1)
PROT SERPL-MCNC: 6.6 G/DL (ref 6–8.4)
PROT UR QL STRIP: ABNORMAL
RBC # BLD AUTO: 4.02 M/UL (ref 4–5.4)
RBC #/AREA URNS HPF: 10 /HPF (ref 0–4)
SODIUM SERPL-SCNC: 134 MMOL/L (ref 136–145)
SP GR UR STRIP: >=1.03 (ref 1–1.03)
URN SPEC COLLECT METH UR: ABNORMAL
UROBILINOGEN UR STRIP-ACNC: NEGATIVE EU/DL
WBC # BLD AUTO: 27.8 K/UL (ref 3.9–12.7)
WBC #/AREA URNS HPF: 20 /HPF (ref 0–5)

## 2020-01-30 PROCEDURE — 96376 TX/PRO/DX INJ SAME DRUG ADON: CPT

## 2020-01-30 PROCEDURE — 81000 URINALYSIS NONAUTO W/SCOPE: CPT

## 2020-01-30 PROCEDURE — 63600175 PHARM REV CODE 636 W HCPCS: Performed by: EMERGENCY MEDICINE

## 2020-01-30 PROCEDURE — 87040 BLOOD CULTURE FOR BACTERIA: CPT | Mod: 59

## 2020-01-30 PROCEDURE — 80053 COMPREHEN METABOLIC PANEL: CPT

## 2020-01-30 PROCEDURE — 87086 URINE CULTURE/COLONY COUNT: CPT

## 2020-01-30 PROCEDURE — 83690 ASSAY OF LIPASE: CPT

## 2020-01-30 PROCEDURE — 96365 THER/PROPH/DIAG IV INF INIT: CPT

## 2020-01-30 PROCEDURE — G0378 HOSPITAL OBSERVATION PER HR: HCPCS

## 2020-01-30 PROCEDURE — 99285 EMERGENCY DEPT VISIT HI MDM: CPT | Mod: 25

## 2020-01-30 PROCEDURE — 96361 HYDRATE IV INFUSION ADD-ON: CPT

## 2020-01-30 PROCEDURE — 87186 SC STD MICRODIL/AGAR DIL: CPT

## 2020-01-30 PROCEDURE — 85025 COMPLETE CBC W/AUTO DIFF WBC: CPT

## 2020-01-30 PROCEDURE — 96375 TX/PRO/DX INJ NEW DRUG ADDON: CPT

## 2020-01-30 PROCEDURE — 93005 ELECTROCARDIOGRAM TRACING: CPT

## 2020-01-30 PROCEDURE — 80074 ACUTE HEPATITIS PANEL: CPT

## 2020-01-30 PROCEDURE — 87088 URINE BACTERIA CULTURE: CPT

## 2020-01-30 PROCEDURE — 87077 CULTURE AEROBIC IDENTIFY: CPT

## 2020-01-30 PROCEDURE — 83605 ASSAY OF LACTIC ACID: CPT

## 2020-01-30 RX ORDER — MORPHINE SULFATE 4 MG/ML
4 INJECTION, SOLUTION INTRAMUSCULAR; INTRAVENOUS EVERY 4 HOURS PRN
Status: DISCONTINUED | OUTPATIENT
Start: 2020-01-30 | End: 2020-01-31

## 2020-01-30 RX ORDER — ONDANSETRON 2 MG/ML
4 INJECTION INTRAMUSCULAR; INTRAVENOUS
Status: COMPLETED | OUTPATIENT
Start: 2020-01-30 | End: 2020-01-30

## 2020-01-30 RX ORDER — SODIUM CHLORIDE 9 MG/ML
INJECTION, SOLUTION INTRAVENOUS CONTINUOUS
Status: DISCONTINUED | OUTPATIENT
Start: 2020-01-30 | End: 2020-01-31

## 2020-01-30 RX ORDER — ONDANSETRON 2 MG/ML
4 INJECTION INTRAMUSCULAR; INTRAVENOUS EVERY 8 HOURS PRN
Status: DISCONTINUED | OUTPATIENT
Start: 2020-01-30 | End: 2020-01-31

## 2020-01-30 RX ORDER — SULFAMETHOXAZOLE AND TRIMETHOPRIM 800; 160 MG/1; MG/1
1 TABLET ORAL 2 TIMES DAILY
Status: DISCONTINUED | OUTPATIENT
Start: 2020-01-31 | End: 2020-01-31

## 2020-01-30 RX ORDER — MORPHINE SULFATE 4 MG/ML
4 INJECTION, SOLUTION INTRAMUSCULAR; INTRAVENOUS
Status: COMPLETED | OUTPATIENT
Start: 2020-01-30 | End: 2020-01-30

## 2020-01-30 RX ADMIN — SODIUM CHLORIDE 1000 ML: 0.9 INJECTION, SOLUTION INTRAVENOUS at 04:01

## 2020-01-30 RX ADMIN — SODIUM CHLORIDE: 0.9 INJECTION, SOLUTION INTRAVENOUS at 09:01

## 2020-01-30 RX ADMIN — ONDANSETRON 4 MG: 2 INJECTION INTRAMUSCULAR; INTRAVENOUS at 04:01

## 2020-01-30 RX ADMIN — MORPHINE SULFATE 4 MG: 4 INJECTION INTRAVENOUS at 04:01

## 2020-01-30 RX ADMIN — SODIUM CHLORIDE 600 ML: 0.9 INJECTION, SOLUTION INTRAVENOUS at 06:01

## 2020-01-30 RX ADMIN — MORPHINE SULFATE 4 MG: 4 INJECTION INTRAVENOUS at 11:01

## 2020-01-30 RX ADMIN — PIPERACILLIN SODIUM AND TAZOBACTAM SODIUM 4.5 G: 4; .5 INJECTION, POWDER, LYOPHILIZED, FOR SOLUTION INTRAVENOUS at 06:01

## 2020-01-30 RX ADMIN — ONDANSETRON 4 MG: 2 INJECTION INTRAMUSCULAR; INTRAVENOUS at 11:01

## 2020-01-30 NOTE — ED PROVIDER NOTES
Encounter Date: 1/30/2020    SCRIBE #1 NOTE: I, Tracy Mcknight, am scribing for, and in the presence of,  Skyler Molina III, MD. I have scribed the entire note.       History     Chief Complaint   Patient presents with    Abdominal Pain     Reports abdominal pain and bloating since Sun. Reports pain worse today and now having dizziness. Also reports dysuria and hematuria.       The patient is a 64-year-old female who has a history of arthritis, migraine headaches, and osteoporosis who presents with worsening upper abdominal pain for two days.  She has associated nausea and vomiting. She denies diarrhea and constipation.  She denies fever and chills. She has associated mild cough with shortness of breath. Her abdominal pain is exacerbated by movement.  She also complains of a throbbing bifrontal headache that began earlier today.  It is not been improved with Norco.  She denies history of abdominal surgeries.  She has associated pain with urination.  She denies blood in her stools and urine.    The history is provided by the patient. No  was used.     Review of patient's allergies indicates:  No Known Allergies  Past Medical History:   Diagnosis Date    Arthritis     Migraine headache     Osteoporosis      Past Surgical History:   Procedure Laterality Date    SHOULDER SURGERY Right      No family history on file.  Social History     Tobacco Use    Smoking status: Never Smoker    Smokeless tobacco: Never Used   Substance Use Topics    Alcohol use: No    Drug use: Not on file     Review of Systems   Constitutional: Negative for chills and fever.   HENT: Negative for sore throat and trouble swallowing.    Eyes: Negative for visual disturbance.   Respiratory: Positive for cough. Negative for shortness of breath.    Gastrointestinal: Positive for abdominal pain, nausea and vomiting. Negative for blood in stool, constipation and diarrhea.   Endocrine: Negative for polydipsia and polyuria.    Genitourinary: Positive for dysuria. Negative for frequency and hematuria.   Musculoskeletal: Negative for arthralgias, back pain, myalgias, neck pain and neck stiffness.   Skin: Negative for rash.   Allergic/Immunologic: Negative for environmental allergies.   Neurological: Positive for headaches. Negative for dizziness, syncope, weakness, light-headedness and numbness.       Physical Exam     Initial Vitals [01/30/20 1525]   BP Pulse Resp Temp SpO2   (!) 150/71 106 20 97.5 °F (36.4 °C) 97 %      MAP       --         Physical Exam    Nursing note and vitals reviewed.  Constitutional: She appears well-developed and well-nourished. She is not diaphoretic. No distress.   HENT:   Head: Normocephalic and atraumatic.   Mouth/Throat: Oropharynx is clear and moist.   Eyes: Conjunctivae are normal. No scleral icterus.   Cardiovascular: Normal rate, regular rhythm and normal heart sounds. Exam reveals no gallop and no friction rub.    No murmur heard.  Pulmonary/Chest: Breath sounds normal. No respiratory distress. She has no wheezes. She has no rhonchi. She has no rales.   Abdominal: Soft. Bowel sounds are normal. She exhibits no distension and no mass. There is tenderness in the right upper quadrant and epigastric area. There is guarding. There is no rebound and no CVA tenderness.   Musculoskeletal: Normal range of motion. She exhibits no edema or tenderness.   No calf swelling or tenderness bilaterally. Negative bilateral Jessica's sign.     Neurological: She is alert and oriented to person, place, and time. She has normal strength. No cranial nerve deficit. Coordination normal. GCS score is 15. GCS eye subscore is 4. GCS verbal subscore is 5. GCS motor subscore is 6.   Skin: Skin is warm and dry. No pallor.         ED Course   Procedures  Labs Reviewed   CBC W/ AUTO DIFFERENTIAL - Abnormal; Notable for the following components:       Result Value    WBC 27.80 (*)     Hemoglobin 9.6 (*)     Hematocrit 28.4 (*)     Mean  Corpuscular Volume 71 (*)     Mean Corpuscular Hemoglobin 23.9 (*)     RDW 17.0 (*)     Gran # (ANC) 23.3 (*)     Mono # 3.3 (*)     Gran% 83.9 (*)     Lymph% 4.0 (*)     All other components within normal limits   COMPREHENSIVE METABOLIC PANEL - Abnormal; Notable for the following components:    Sodium 134 (*)     BUN, Bld 29 (*)     Creatinine 1.8 (*)     Albumin 2.8 (*)     Total Bilirubin 1.9 (*)     Alkaline Phosphatase 501 (*)     AST 59 (*)     eGFR if  34 (*)     eGFR if non  30 (*)     All other components within normal limits   URINALYSIS, REFLEX TO URINE CULTURE - Abnormal; Notable for the following components:    Appearance, UA Cloudy (*)     Specific Gravity, UA >=1.030 (*)     Protein, UA 2+ (*)     Bilirubin (UA) 1+ (*)     Occult Blood UA 1+ (*)     Leukocytes, UA 2+ (*)     All other components within normal limits    Narrative:     Preferred Collection Type->Urine, Clean Catch   URINALYSIS MICROSCOPIC - Abnormal; Notable for the following components:    RBC, UA 10 (*)     WBC, UA 20 (*)     Bacteria Moderate (*)     Granular Casts, UA 3 (*)     All other components within normal limits    Narrative:     Preferred Collection Type->Urine, Clean Catch   CULTURE, URINE   CULTURE, BLOOD   CULTURE, BLOOD   LIPASE   LACTIC ACID, PLASMA          Imaging Results          X-Ray Chest AP Portable (Final result)  Result time 01/30/20 16:41:16    Final result by Toma Llanos MD (01/30/20 16:41:16)                 Impression:      Mild bibasilar atelectatic changes      Electronically signed by: Toma Llanos MD  Date:    01/30/2020  Time:    16:41             Narrative:    EXAMINATION:  XR CHEST AP PORTABLE    CLINICAL HISTORY:  Unspecified abdominal pain    TECHNIQUE:  Single frontal view of the chest was performed.    COMPARISON:  11/02/2017    FINDINGS:  The cardiac silhouette is normal in size.  The pulmonary vascularity appears normal.  Low lung volume, poor  inspiratory effort.  I suspect minimal bibasilar atelectasis.  No large area of focal airspace consolidation.  No pleural effusion.  No pneumothorax.  Postsurgical changes of the right proximal humerus.                                 Medical Decision Making:   History:   Old Medical Records: I decided to obtain old medical records.  Differential Diagnosis:   Cholelithiasis, cholecystitis, pancreatitis, gastritis, appendicitis  Clinical Tests:   Lab Tests: Reviewed and Ordered  Radiological Study: Reviewed and Ordered  ED Management:  Patient presenting with worsening abdominal pain for several days with associated nausea and vomiting. She is afebrile.  She has significant tenderness in the epigastrium and right upper quadrant.  She has an elevated white blood cell count, elevated alkaline phosphatase, and elevated total bilirubin.  She has renal insufficiency.  Right upper quadrant ultrasound is pending at shift change.  The patient has received 30 cc/kilogram IV fluid bolus and is receiving Zosyn.  She is being signed out to Dr. Velásquez at shift change.    ===========  Care patient assumed from Dr. Molina by self as of shift change  Abdominal ultrasound negative for acute gallbladder pathology per final Radiology read  CT abdomen pelvis negative for acute intra-abdominal abnormality per final Radiology read  Patient continues to have midepigastric pain despite pain medications administered in emergency department; laboratory analysis notable for creatinine of 1.8 which is consistent with DONALD  Discussed case and findings with on-call general surgeon  who did not feel the patient warranted admission or surgical intervention at this time recommend admission to [facility] for further pain control, management DONALD; Dr. Landis stated to me that he could be consulted by the primary team if needbe  Pt in agreement with plan for admission               Attending Attestation:             Attending  ED Notes:   Portions of this chart were completed by the scribe by interpretive transcription of statements made by the patient as a result of my questions at the bedside. Other portions were completed by the scribe from statements made by me for direct transcription into the medical record. Following completion of the charting by the scribe, I made modifications for both correctness and proper phrasing.  - Skyler Molina III, M.D.                        Clinical Impression:       ICD-10-CM ICD-9-CM   1. Nausea R11.0 787.02   2. Acute abdominal pain R10.9 789.00     338.19   3. Renal insufficiency N28.9 593.9   4. Shortness of breath R06.02 786.05                             Jaspreet Velásquez MD  01/31/20 0135

## 2020-01-30 NOTE — ED NOTES
Pt presents to the ED w/ c/ of generalized abd pain, bloating, and distention since Sunday. Pt reports abd pain is constant. Pt reports intermittent nausea with 3 episodes of vomiting since Sunday. Pt reports that abd pain radiates to bilateral flanks. Pt reports hematuria and dysuria on urination since Sunday. Pt denies chest pain or SOB.

## 2020-01-31 PROBLEM — D17.71 ANGIOMYOLIPOMA OF LEFT KIDNEY: Status: ACTIVE | Noted: 2020-01-31

## 2020-01-31 LAB
ALBUMIN SERPL BCP-MCNC: 2.1 G/DL (ref 3.5–5.2)
ALP SERPL-CCNC: 446 U/L (ref 55–135)
ALT SERPL W/O P-5'-P-CCNC: 21 U/L (ref 10–44)
ANION GAP SERPL CALC-SCNC: 8 MMOL/L (ref 8–16)
APTT BLDCRRT: 35.7 SEC (ref 21–32)
AST SERPL-CCNC: 32 U/L (ref 10–40)
BASOPHILS # BLD AUTO: 0.04 K/UL (ref 0–0.2)
BASOPHILS NFR BLD: 0.2 % (ref 0–1.9)
BILIRUB SERPL-MCNC: 1.6 MG/DL (ref 0.1–1)
BUN SERPL-MCNC: 29 MG/DL (ref 8–23)
CALCIUM SERPL-MCNC: 8.8 MG/DL (ref 8.7–10.5)
CHLORIDE SERPL-SCNC: 108 MMOL/L (ref 95–110)
CO2 SERPL-SCNC: 21 MMOL/L (ref 23–29)
CREAT SERPL-MCNC: 1.7 MG/DL (ref 0.5–1.4)
DIFFERENTIAL METHOD: ABNORMAL
EOSINOPHIL # BLD AUTO: 0.2 K/UL (ref 0–0.5)
EOSINOPHIL NFR BLD: 0.6 % (ref 0–8)
ERYTHROCYTE [DISTWIDTH] IN BLOOD BY AUTOMATED COUNT: 17.4 % (ref 11.5–14.5)
EST. GFR  (AFRICAN AMERICAN): 37 ML/MIN/1.73 M^2
EST. GFR  (NON AFRICAN AMERICAN): 32 ML/MIN/1.73 M^2
ESTIMATED AVG GLUCOSE: 117 MG/DL (ref 68–131)
GGT SERPL-CCNC: 172 U/L (ref 8–55)
GLUCOSE SERPL-MCNC: 88 MG/DL (ref 70–110)
HAV IGM SERPL QL IA: NEGATIVE
HBA1C MFR BLD HPLC: 5.7 % (ref 4–5.6)
HBV CORE IGM SERPL QL IA: NEGATIVE
HBV SURFACE AG SERPL QL IA: NEGATIVE
HCT VFR BLD AUTO: 27.6 % (ref 37–48.5)
HCV AB SERPL QL IA: NEGATIVE
HGB BLD-MCNC: 9 G/DL (ref 12–16)
INR PPP: 1.1 (ref 0.8–1.2)
LYMPHOCYTES # BLD AUTO: 1.3 K/UL (ref 1–4.8)
LYMPHOCYTES NFR BLD: 4.9 % (ref 18–48)
MAGNESIUM SERPL-MCNC: 2.2 MG/DL (ref 1.6–2.6)
MCH RBC QN AUTO: 23.4 PG (ref 27–31)
MCHC RBC AUTO-ENTMCNC: 32.6 G/DL (ref 32–36)
MCV RBC AUTO: 72 FL (ref 82–98)
MONOCYTES # BLD AUTO: 3 K/UL (ref 0.3–1)
MONOCYTES NFR BLD: 11.6 % (ref 4–15)
NEUTROPHILS # BLD AUTO: 21.1 K/UL (ref 1.8–7.7)
NEUTROPHILS NFR BLD: 82.7 % (ref 38–73)
PHOSPHATE SERPL-MCNC: 2.8 MG/DL (ref 2.7–4.5)
PLATELET # BLD AUTO: 314 K/UL (ref 150–350)
PMV BLD AUTO: 9.6 FL (ref 9.2–12.9)
POTASSIUM SERPL-SCNC: 3.8 MMOL/L (ref 3.5–5.1)
PROT SERPL-MCNC: 5.5 G/DL (ref 6–8.4)
PROTHROMBIN TIME: 11.6 SEC (ref 9–12.5)
RBC # BLD AUTO: 3.84 M/UL (ref 4–5.4)
SODIUM SERPL-SCNC: 137 MMOL/L (ref 136–145)
TSH SERPL DL<=0.005 MIU/L-ACNC: 1.08 UIU/ML (ref 0.4–4)
WBC # BLD AUTO: 25.78 K/UL (ref 3.9–12.7)

## 2020-01-31 PROCEDURE — 94799 UNLISTED PULMONARY SVC/PX: CPT

## 2020-01-31 PROCEDURE — S0030 INJECTION, METRONIDAZOLE: HCPCS | Performed by: STUDENT IN AN ORGANIZED HEALTH CARE EDUCATION/TRAINING PROGRAM

## 2020-01-31 PROCEDURE — 82977 ASSAY OF GGT: CPT

## 2020-01-31 PROCEDURE — 96376 TX/PRO/DX INJ SAME DRUG ADON: CPT

## 2020-01-31 PROCEDURE — 25000003 PHARM REV CODE 250: Performed by: STUDENT IN AN ORGANIZED HEALTH CARE EDUCATION/TRAINING PROGRAM

## 2020-01-31 PROCEDURE — 63600175 PHARM REV CODE 636 W HCPCS: Performed by: EMERGENCY MEDICINE

## 2020-01-31 PROCEDURE — 85025 COMPLETE CBC W/AUTO DIFF WBC: CPT

## 2020-01-31 PROCEDURE — G0378 HOSPITAL OBSERVATION PER HR: HCPCS

## 2020-01-31 PROCEDURE — 84100 ASSAY OF PHOSPHORUS: CPT

## 2020-01-31 PROCEDURE — 80053 COMPREHEN METABOLIC PANEL: CPT

## 2020-01-31 PROCEDURE — 85610 PROTHROMBIN TIME: CPT

## 2020-01-31 PROCEDURE — 84443 ASSAY THYROID STIM HORMONE: CPT

## 2020-01-31 PROCEDURE — 96361 HYDRATE IV INFUSION ADD-ON: CPT

## 2020-01-31 PROCEDURE — 36415 COLL VENOUS BLD VENIPUNCTURE: CPT

## 2020-01-31 PROCEDURE — 94761 N-INVAS EAR/PLS OXIMETRY MLT: CPT

## 2020-01-31 PROCEDURE — 94664 DEMO&/EVAL PT USE INHALER: CPT

## 2020-01-31 PROCEDURE — 99900035 HC TECH TIME PER 15 MIN (STAT)

## 2020-01-31 PROCEDURE — 96375 TX/PRO/DX INJ NEW DRUG ADDON: CPT

## 2020-01-31 PROCEDURE — 27000646 HC AEROBIKA DEVICE

## 2020-01-31 PROCEDURE — 83735 ASSAY OF MAGNESIUM: CPT

## 2020-01-31 PROCEDURE — 85730 THROMBOPLASTIN TIME PARTIAL: CPT

## 2020-01-31 PROCEDURE — 83036 HEMOGLOBIN GLYCOSYLATED A1C: CPT

## 2020-01-31 PROCEDURE — 63600175 PHARM REV CODE 636 W HCPCS: Performed by: STUDENT IN AN ORGANIZED HEALTH CARE EDUCATION/TRAINING PROGRAM

## 2020-01-31 RX ORDER — MORPHINE SULFATE 10 MG/ML
4 INJECTION, SOLUTION INTRAMUSCULAR; INTRAVENOUS EVERY 4 HOURS PRN
Status: DISCONTINUED | OUTPATIENT
Start: 2020-01-31 | End: 2020-01-31

## 2020-01-31 RX ORDER — ACETAMINOPHEN 325 MG/1
650 TABLET ORAL EVERY 8 HOURS PRN
Status: DISCONTINUED | OUTPATIENT
Start: 2020-01-31 | End: 2020-01-31

## 2020-01-31 RX ORDER — HYDROCODONE BITARTRATE AND ACETAMINOPHEN 5; 325 MG/1; MG/1
1 TABLET ORAL EVERY 6 HOURS PRN
Status: DISCONTINUED | OUTPATIENT
Start: 2020-01-31 | End: 2020-02-01 | Stop reason: HOSPADM

## 2020-01-31 RX ORDER — SODIUM CHLORIDE, SODIUM LACTATE, POTASSIUM CHLORIDE, CALCIUM CHLORIDE 600; 310; 30; 20 MG/100ML; MG/100ML; MG/100ML; MG/100ML
INJECTION, SOLUTION INTRAVENOUS CONTINUOUS
Status: DISCONTINUED | OUTPATIENT
Start: 2020-01-31 | End: 2020-01-31

## 2020-01-31 RX ORDER — SODIUM CHLORIDE 0.9 % (FLUSH) 0.9 %
5 SYRINGE (ML) INJECTION
Status: DISCONTINUED | OUTPATIENT
Start: 2020-01-31 | End: 2020-02-01 | Stop reason: HOSPADM

## 2020-01-31 RX ORDER — CEFEPIME HYDROCHLORIDE 1 G/50ML
1 INJECTION, SOLUTION INTRAVENOUS
Status: DISCONTINUED | OUTPATIENT
Start: 2020-01-31 | End: 2020-01-31

## 2020-01-31 RX ORDER — ACETAMINOPHEN 325 MG/1
650 TABLET ORAL EVERY 4 HOURS PRN
Status: DISCONTINUED | OUTPATIENT
Start: 2020-01-31 | End: 2020-01-31

## 2020-01-31 RX ORDER — CIPROFLOXACIN 2 MG/ML
400 INJECTION, SOLUTION INTRAVENOUS
Status: DISCONTINUED | OUTPATIENT
Start: 2020-01-31 | End: 2020-02-01 | Stop reason: HOSPADM

## 2020-01-31 RX ORDER — TALC
9 POWDER (GRAM) TOPICAL NIGHTLY PRN
Status: DISCONTINUED | OUTPATIENT
Start: 2020-01-31 | End: 2020-01-31

## 2020-01-31 RX ORDER — PANTOPRAZOLE SODIUM 40 MG/1
40 TABLET, DELAYED RELEASE ORAL DAILY
Status: DISCONTINUED | OUTPATIENT
Start: 2020-01-31 | End: 2020-02-01 | Stop reason: HOSPADM

## 2020-01-31 RX ORDER — HYDRALAZINE HYDROCHLORIDE 20 MG/ML
10 INJECTION INTRAMUSCULAR; INTRAVENOUS EVERY 8 HOURS PRN
Status: DISCONTINUED | OUTPATIENT
Start: 2020-01-31 | End: 2020-02-01 | Stop reason: HOSPADM

## 2020-01-31 RX ORDER — METRONIDAZOLE 500 MG/100ML
500 INJECTION, SOLUTION INTRAVENOUS
Status: DISCONTINUED | OUTPATIENT
Start: 2020-01-31 | End: 2020-01-31

## 2020-01-31 RX ORDER — AMOXICILLIN 250 MG
1 CAPSULE ORAL 2 TIMES DAILY
Status: DISCONTINUED | OUTPATIENT
Start: 2020-01-31 | End: 2020-01-31

## 2020-01-31 RX ORDER — CIPROFLOXACIN 2 MG/ML
400 INJECTION, SOLUTION INTRAVENOUS
Status: DISCONTINUED | OUTPATIENT
Start: 2020-01-31 | End: 2020-01-31

## 2020-01-31 RX ORDER — ACETAMINOPHEN 325 MG/1
650 TABLET ORAL EVERY 4 HOURS PRN
Status: DISCONTINUED | OUTPATIENT
Start: 2020-01-31 | End: 2020-02-01 | Stop reason: HOSPADM

## 2020-01-31 RX ORDER — ONDANSETRON 8 MG/1
8 TABLET, ORALLY DISINTEGRATING ORAL EVERY 6 HOURS PRN
Status: DISCONTINUED | OUTPATIENT
Start: 2020-01-31 | End: 2020-02-01 | Stop reason: HOSPADM

## 2020-01-31 RX ORDER — SUMATRIPTAN SUCCINATE 25 MG/1
100 TABLET ORAL ONCE
Status: COMPLETED | OUTPATIENT
Start: 2020-01-31 | End: 2020-01-31

## 2020-01-31 RX ADMIN — PANTOPRAZOLE SODIUM 40 MG: 40 TABLET, DELAYED RELEASE ORAL at 09:01

## 2020-01-31 RX ADMIN — SENNOSIDES AND DOCUSATE SODIUM 1 TABLET: 8.6; 5 TABLET ORAL at 09:01

## 2020-01-31 RX ADMIN — METRONIDAZOLE 500 MG: 500 INJECTION, SOLUTION INTRAVENOUS at 09:01

## 2020-01-31 RX ADMIN — MORPHINE SULFATE 4 MG: 4 INJECTION INTRAVENOUS at 06:01

## 2020-01-31 RX ADMIN — SODIUM CHLORIDE, SODIUM LACTATE, POTASSIUM CHLORIDE, AND CALCIUM CHLORIDE: .6; .31; .03; .02 INJECTION, SOLUTION INTRAVENOUS at 01:01

## 2020-01-31 RX ADMIN — MORPHINE SULFATE 4 MG: 4 INJECTION INTRAVENOUS at 04:01

## 2020-01-31 RX ADMIN — SUMATRIPTAN SUCCINATE 100 MG: 25 TABLET ORAL at 06:01

## 2020-01-31 RX ADMIN — CIPROFLOXACIN 400 MG: 2 INJECTION, SOLUTION INTRAVENOUS at 10:01

## 2020-01-31 NOTE — SUBJECTIVE & OBJECTIVE
Past Medical History:   Diagnosis Date    Arthritis     Migraine headache     Osteoporosis        Past Surgical History:   Procedure Laterality Date    SHOULDER SURGERY Right        Review of patient's allergies indicates:  No Known Allergies    No current facility-administered medications on file prior to encounter.      Current Outpatient Medications on File Prior to Encounter   Medication Sig    ALPRAZolam (XANAX) 0.25 MG tablet Take 0.25 mg by mouth 3 (three) times daily.    calcium carbonate-vitamin D3 500 mg(1,250mg) -400 unit Chew Take 1 tablet by mouth.    diclofenac sodium (VOLTAREN) 1 % Gel     DULoxetine (CYMBALTA) 30 MG capsule Take 1 capsule (30 mg total) by mouth once daily.    ergocalciferol (ERGOCALCIFEROL) 50,000 unit Cap Take 1 capsule once a week for 12 weeks    gabapentin (NEURONTIN) 400 MG capsule     HYDROcodone-acetaminophen (NORCO)  mg per tablet TAKE ONE TABLET BY MOUTH TWICE DAILY AS NEEDED FOR PAIN FOR 30 DAYS    hydrOXYzine pamoate (VISTARIL) 25 MG Cap Take 25 mg by mouth nightly as needed.    naproxen (NAPROSYN) 500 MG tablet     ondansetron (ZOFRAN-ODT) 4 MG TbDL Take 1 tablet (4 mg total) by mouth every 8 (eight) hours as needed.    oxyCODONE-acetaminophen (PERCOCET) 5-325 mg per tablet Take 1 tablet by mouth every 4 (four) hours as needed for Pain.    predniSONE (DELTASONE) 20 MG tablet Take 2 tablets (40 mg total) by mouth once daily.    sumatriptan (IMITREX) 100 MG tablet Take 100 mg by mouth every 2 (two) hours as needed.    tiZANidine (ZANAFLEX) 4 MG tablet Take 4 mg by mouth 2 (two) times daily.    VITAMIN D2 50,000 unit capsule TAKE ONE CAPSULE BY MOUTH EVERY WEEK FOR 12 WEEKS     Family History     None        Tobacco Use    Smoking status: Never Smoker    Smokeless tobacco: Never Used   Substance and Sexual Activity    Alcohol use: No    Drug use: Not on file    Sexual activity: Not on file     Review of Systems   Constitutional: Negative for  activity change, chills and fever.   HENT: Negative for congestion, ear pain, sinus pressure and sinus pain.    Eyes: Negative for visual disturbance.   Respiratory: Negative for cough, choking, chest tightness and shortness of breath.    Cardiovascular: Negative for chest pain and leg swelling.   Gastrointestinal: Positive for abdominal pain, nausea (resolved) and vomiting (resolved). Negative for constipation and diarrhea.   Genitourinary: Positive for dysuria, frequency and urgency. Negative for difficulty urinating and flank pain.   Musculoskeletal: Negative for back pain, myalgias, neck pain and neck stiffness.   Skin: Negative for wound.   Neurological: Positive for headaches (migraines). Negative for dizziness, light-headedness and numbness.     Objective:     Vital Signs (Most Recent):  Temp: 98 °F (36.7 °C) (01/30/20 1901)  Pulse: 97 (01/30/20 2055)  Resp: (!) 25 (01/30/20 2055)  BP: (!) 141/65 (01/30/20 2055)  SpO2: (!) 94 % (01/30/20 2055) Vital Signs (24h Range):  Temp:  [97.5 °F (36.4 °C)-98 °F (36.7 °C)] 98 °F (36.7 °C)  Pulse:  [] 97  Resp:  [15-25] 25  SpO2:  [93 %-100 %] 94 %  BP: (104-150)/(55-73) 141/65     Weight: 55.8 kg (123 lb)  Body mass index is 23.24 kg/m².    Physical Exam   Constitutional: She is oriented to person, place, and time. She appears well-developed and well-nourished.   HENT:   Head: Normocephalic and atraumatic.   Right Ear: External ear normal.   Left Ear: External ear normal.   Nose: Nose normal.   Mouth/Throat: Oropharynx is clear and moist.   Eyes: Conjunctivae and EOM are normal.   Neck: Normal range of motion. Neck supple.   Cardiovascular: Normal rate, regular rhythm, normal heart sounds and intact distal pulses.   Pulmonary/Chest: Effort normal and breath sounds normal. No respiratory distress.   Abdominal: Soft. Bowel sounds are normal. She exhibits no distension, no fluid wave and no mass. There is no hepatosplenomegaly. There is generalized tenderness. There  is guarding (voluntary ). There is no rigidity, no rebound, no CVA tenderness, no tenderness at McBurney's point and negative Peñaloza's sign.   Musculoskeletal: Normal range of motion. She exhibits no edema.   Neurological: She is alert and oriented to person, place, and time.   Skin: Skin is warm and dry. Capillary refill takes less than 2 seconds.   Psychiatric: She has a normal mood and affect.   Nursing note and vitals reviewed.        CRANIAL NERVES     CN III, IV, VI   Extraocular motions are normal.        Significant Labs:   CBC:   Recent Labs   Lab 01/30/20  1623   WBC 27.80*   HGB 9.6*   HCT 28.4*        CMP:   Recent Labs   Lab 01/30/20  1623   *   K 4.2      CO2 23   GLU 82   BUN 29*   CREATININE 1.8*   CALCIUM 9.4   PROT 6.6   ALBUMIN 2.8*   BILITOT 1.9*   ALKPHOS 501*   AST 59*   ALT 27   ANIONGAP 8   EGFRNONAA 30*     Lactic Acid:   Recent Labs   Lab 01/30/20  1635   LACTATE 1.7     Lipase:   Recent Labs   Lab 01/30/20  1623   LIPASE 6     Urine Studies:   Recent Labs   Lab 01/30/20  1623   COLORU Yellow   APPEARANCEUA Cloudy*   PHUR 6.0   SPECGRAV >=1.030*   PROTEINUA 2+*   GLUCUA Negative   KETONESU Negative   BILIRUBINUA 1+*   OCCULTUA 1+*   NITRITE Negative   UROBILINOGEN Negative   LEUKOCYTESUR 2+*   RBCUA 10*   WBCUA 20*   BACTERIA Moderate*   HYALINECASTS 0     All pertinent labs within the past 24 hours have been reviewed.    Significant Imaging:   CT Abdomen Pelvis  Without Contrast   Final Result      1. Urinary bladder wall thickening could represent cystitis.  Recommend clinical correlation.   2. Minimal nonobstructing nephrolithiasis at the lower pole of the right kidney with probable mild right renal cortical scarring.   3. Mild probable bibasilar atelectasis.         Electronically signed by: Pino Nichols   Date:    01/30/2020   Time:    21:01      US Abdomen Limited   Final Result      1. No acute sonographic abnormality.   2. Small probable left renal  angiomyolipoma.  Recommend surveillance.         Electronically signed by: Pino Nichols   Date:    01/30/2020   Time:    19:42      X-Ray Chest AP Portable   Final Result      Mild bibasilar atelectatic changes         Electronically signed by: Toma Llanos MD   Date:    01/30/2020   Time:    16:41           I have reviewed and interpreted all pertinent imaging results/findings within the past 24 hours.

## 2020-01-31 NOTE — PLAN OF CARE
VN completed admission questions with patient. Plan of care was discussed including NPO status.  Intake and output measuring discussed.  All questions answered.  Education given on safety measures and using call light before getting out of bed by herself. Patient verbalized understanding. Bed locked and in lowest position. Alarm on.

## 2020-01-31 NOTE — ASSESSMENT & PLAN NOTE
Alk Phos 501  Will order GGT to confirm hepatic origin   AST 57  TBili 1.9  US abdomen unremarkable  CT abdomen Pelvis not concerning for Gall stones   Gen Surg (Boby) was consulted by ED, no intervention at this time needed   PT INR/ PTT pending   Hepatitis panel pending

## 2020-01-31 NOTE — PLAN OF CARE
Visit with pt for d/c planning.  Pt lives at home, independent with ADL's, no HH or DME.  Pt's mother lives with her, is also independent.  At time of d/c she will be picked up by either her mother or her neighbor Marely.  Pt anticipates d/c home tomorrow with no needs.  She was informed she will d/c on po antibiotics, would like script to go to [facility] as they will deliver to her.  She is aware of hospital f/u appt.  We discussed need to complete full course of antibiotics , pt verbalized understanding.    Discharge planning brochure provided. White board updated with CM name & contact info.  Pt encouraged to call with any questions or needs. CM will continue to follow patient throughout the transitions of care, and assist with any discharge needs.       01/31/20 1132   Discharge Assessment   Assessment Type Discharge Planning Assessment   Confirmed/corrected address and phone number on facesheet? Yes   Assessment information obtained from? Patient   Expected Length of Stay (days) 2   Communicated expected length of stay with patient/caregiver yes   Prior to hospitilization cognitive status: Alert/Oriented   Prior to hospitalization functional status: Independent   Current cognitive status: Alert/Oriented   Current Functional Status: Independent   Lives With parent(s)   Able to Return to Prior Arrangements yes   Is patient able to care for self after discharge? Yes   Readmission Within the Last 30 Days no previous admission in last 30 days   Patient currently being followed by outpatient case management? No   Patient currently receives any other outside agency services? No   Equipment Currently Used at Home none   Do you have any problems affording any of your prescribed medications? No   Is the patient taking medications as prescribed? yes   Does the patient have transportation home? Yes   Transportation Anticipated family or friend will provide   Does the patient receive services at the [facility]? No    Discharge Plan A Home   Discharge Plan B Home with family   DME Needed Upon Discharge  none   Patient/Family in Agreement with Plan yes       Alysa Arias, GALE    [phone]

## 2020-01-31 NOTE — PROGRESS NOTES
[facility] Hospital Medicine  Progress Note    Patient Name: Shari Carpenter  MRN: [id number]  Patient Class: OP- Observation   Admission Date: 1/30/2020  Length of Stay: 0 days  Attending Physician: Tylor Hernandez III, MD  Primary Care Provider: Lilia Woodard MD        Subjective:     Principal Problem:Cystitis        HPI:  Shari Carpenter is a 64 y.o. female with significant medical history of arthritis, migraine headaches, and osteoporosis who presents to [facility] ED for evaluation of progressively  worsening upper abdominal pain for two days.  She has associated nausea and vomiting that has resolved.  Her abdominal pain is exacerbated by movement. She denies any food association with abdominal pain. She denies trying any unusual foods, any raw foods or old foods.    She also complains of a throbbing bifrontal headache that began earlier today typical of her migraines.   She denies history of abdominal surgeries.  She  notes frequency, urgency and dysuria.  She denies diarrhea, constipation,  blood in her stools and urine.    In the ED, /71, ,  leukocytosis of 27.80, the patient is on lcio prednisone.  US abdomen showed no acute sonographic abnormality. CT abdomen pelvis significant for urinary bladder wall thickening could represent cystitis. Urinalysis positive for leukocytes and moderate bacteria. Given the presentation to the ED the patient was started on Sepsis protocol. She was given 30 mg/kg NS and started on Zosyn. General surgery was consulted and saw no need for medical intervention as the gall bladder was unremarkable on imaging. Further lab work up showed BUN/Cr elevated 29/1.8, Albumin 2.8, total Bilirubin 1.9, Alk phos 501, AST 59, ALT 27, GFR 30    Overview/Hospital Course:  No notes on file    Interval History: NAEON. Patient continues to have LUQ pain. Patient reports that she has not been taking Prednisone. Patient was NPO overnight. Denies any nausea or vomiting.  Patient continues to have dysuria.     Review of Systems   Constitutional: Negative for activity change, chills and fever.   HENT: Negative for congestion, ear pain, sinus pressure and sinus pain.    Eyes: Negative for visual disturbance.   Respiratory: Negative for cough, choking, chest tightness and shortness of breath.    Cardiovascular: Negative for chest pain and leg swelling.   Gastrointestinal: Positive for abdominal pain. Negative for constipation, diarrhea, nausea and vomiting.   Genitourinary: Positive for dysuria, frequency and urgency. Negative for difficulty urinating and flank pain.   Musculoskeletal: Negative for back pain, myalgias, neck pain and neck stiffness.   Skin: Negative for wound.   Neurological: Negative for dizziness, light-headedness, numbness and headaches.     Objective:     Vital Signs (Most Recent):  Temp: 97.1 °F (36.2 °C) (01/31/20 0723)  Pulse: (!) 112 (01/31/20 0730)  Resp: 18 (01/31/20 0723)  BP: 123/64 (01/31/20 0723)  SpO2: (!) 92 % (01/31/20 0827) Vital Signs (24h Range):  Temp:  [97.1 °F (36.2 °C)-98 °F (36.7 °C)] 97.1 °F (36.2 °C)  Pulse:  [] 112  Resp:  [15-25] 18  SpO2:  [89 %-100 %] 92 %  BP: (104-150)/(55-73) 123/64     Weight: 60.4 kg (133 lb 2.5 oz)  Body mass index is 25.16 kg/m².    Intake/Output Summary (Last 24 hours) at 1/31/2020 0904  Last data filed at 1/31/2020 0800  Gross per 24 hour   Intake 1940 ml   Output 700 ml   Net 1240 ml      Physical Exam   Constitutional: She is oriented to person, place, and time. She appears well-developed and well-nourished.   HENT:   Head: Normocephalic and atraumatic.   Nose: Nose normal.   Mouth/Throat: Oropharynx is clear and moist.   Eyes: Conjunctivae and EOM are normal.   Neck: Normal range of motion. Neck supple.   Cardiovascular: Normal rate, regular rhythm, normal heart sounds and intact distal pulses.   Pulmonary/Chest: Effort normal and breath sounds normal. No respiratory distress.   Abdominal: Soft. Bowel  sounds are normal. She exhibits no distension, no fluid wave and no mass. There is no hepatosplenomegaly. There is generalized tenderness (LUQ). There is no rigidity, no rebound, no guarding, no CVA tenderness, no tenderness at McBurney's point and negative Peñaloza's sign.   Musculoskeletal: Normal range of motion. She exhibits no edema.   Neurological: She is alert and oriented to person, place, and time.   Skin: Skin is warm and dry. Capillary refill takes less than 2 seconds.   Psychiatric: She has a normal mood and affect.   Nursing note and vitals reviewed.      Significant Labs:   Blood Culture:   Recent Labs   Lab 01/30/20  1708 01/30/20  1715   LABBLOO No Growth to date No Growth to date     CBC:   Recent Labs   Lab 01/30/20  1623 01/31/20  0622   WBC 27.80* 25.78*   HGB 9.6* 9.0*   HCT 28.4* 27.6*    314     CMP:   Recent Labs   Lab 01/30/20  1623 01/31/20  0622   * 137   K 4.2 3.8    108   CO2 23 21*   GLU 82 88   BUN 29* 29*   CREATININE 1.8* 1.7*   CALCIUM 9.4 8.8   PROT 6.6 5.5*   ALBUMIN 2.8* 2.1*   BILITOT 1.9* 1.6*   ALKPHOS 501* 446*   AST 59* 32   ALT 27 21   ANIONGAP 8 8   EGFRNONAA 30* 32*     Lactic Acid:   Recent Labs   Lab 01/30/20  1635   LACTATE 1.7       Significant Imaging:   Imaging Results          CT Abdomen Pelvis  Without Contrast (Final result)  Result time 01/30/20 21:01:58    Final result by Pino Gordon MD (01/30/20 21:01:58)                 Impression:      1. Urinary bladder wall thickening could represent cystitis.  Recommend clinical correlation.  2. Minimal nonobstructing nephrolithiasis at the lower pole of the right kidney with probable mild right renal cortical scarring.  3. Mild probable bibasilar atelectasis.      Electronically signed by: Pino Gordon  Date:    01/30/2020  Time:    21:01             Narrative:    EXAMINATION:  CT ABDOMEN PELVIS WITHOUT CONTRAST    CLINICAL HISTORY:  Infection, abdomen-pelvis;    TECHNIQUE:  Low dose axial images,  sagittal and coronal reformations were obtained from the lung bases to the pubic symphysis.  The lack of IV and bowel contrast may diminish the sensitivity.    COMPARISON:  None    FINDINGS:  Mild probable bibasilar atelectasis.    No focal abnormality of the liver or spleen.    Aorta is normal in caliber.    No calcified gallstones are detected.  The gallbladder is mildly distended.    Bile ducts appear within normal limits.    Mild atrophy of the pancreas.    No evidence of bowel obstruction.    No adenopathy or ascites.    Left kidney is within normal limits.    Mild probable right renal cortical scarring.  Few tiny right lower pole calcifications could represent minimal nephrolithiasis.  No hydronephrosis or hydroureter bilaterally.    Urinary bladder is incompletely distended.  Mild diffuse wall thickening is noted.  Cystitis is a consideration.    Status post hysterectomy.    Degenerative Schmorl's node with mild loss of height at superior endplate of L2 which appears chronic.                               US Abdomen Limited (Final result)  Result time 01/30/20 19:42:53    Final result by Pino Gordon MD (01/30/20 19:42:53)                 Impression:      1. No acute sonographic abnormality.  2. 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  Recommend surveillance.      Electronically signed by: Pino Gordon  Date:    01/30/2020  Time:    19:42             Narrative:    EXAMINATION:  US ABDOMEN LIMITED    CLINICAL HISTORY:  Acute abdominal pain;    TECHNIQUE:  Limited ultrasound of the right upper quadrant of the abdomen (including pancreas, liver, gallbladder, common bile duct, and spleen) was performed.    COMPARISON:  None.    FINDINGS:  Liver: Normal in size, measuring 16 cm. Homogeneous echotexture. No focal hepatic lesions.    Gallbladder: No stone, wall thickening or pericholecystic fluid.  Negative sonographic Peñaloza sign.    Biliary system: The common duct is not dilated, measuring 4.6 mm.  No  intrahepatic ductal dilatation.    Spleen: Normal in size and echotexture, measuring 9.7 cm.    Miscellaneous: No upper abdominal ascites.    No focal abnormality of the pancreas.  The tail is obscured by gas artifact.    The inferior vena cava is present.  No color images.    Probable 1.3 cm peripheral echogenic mass in the midportion of the left kidney is suggestive of a renal angiomyolipoma.    Limited visualization of the right kidney with no focal abnormality identified.  No hydronephrosis bilaterally.                               X-Ray Chest AP Portable (Final result)  Result time 01/30/20 16:41:16    Final result by Toma Llanos MD (01/30/20 16:41:16)                 Impression:      Mild bibasilar atelectatic changes      Electronically signed by: Toma Llanos MD  Date:    01/30/2020  Time:    16:41             Narrative:    EXAMINATION:  XR CHEST AP PORTABLE    CLINICAL HISTORY:  Unspecified abdominal pain    TECHNIQUE:  Single frontal view of the chest was performed.    COMPARISON:  11/02/2017    FINDINGS:  The cardiac silhouette is normal in size.  The pulmonary vascularity appears normal.  Low lung volume, poor inspiratory effort.  I suspect minimal bibasilar atelectasis.  No large area of focal airspace consolidation.  No pleural effusion.  No pneumothorax.  Postsurgical changes of the right proximal humerus.                                    Assessment/Plan:      * Cystitis  Patient with dysuria, frequency and urgency   WBC: 27  Urinalysis with 2 + leukocytes   UA with 20 + WBC, moderate bacteria and granular cast   Ucx pending   CT abdomen and pelvis concerning for Urinary bladder wall thickening could represent cystitis  There are no signs or symptoms that suggest an upper tract or systemic infection  Will continue Vancomycin, Cefepime and Flagyl pending blood cultures      Transaminitis  Alk Phos 501, GGT: 172, AST 59, TBili 1.9 on admission  US abdomen unremarkable  CT abdomen  Pelvis not concerning for Gall stones   Gen Surg (Boby) was consulted by ED, no intervention at this time needed   Hepatitis panel pending         Acute kidney insufficiency  BUN/Cr 29/1.8  Baseline Cr 0.7   Will give MIVF 150 cc/hr 12 hours   Daily CMP to reassess kidney function   Monitor Urinary output   Renally dose medications   Avoid nephrotoxic medications       Nausea  Resolved   Zofran and Phenergan ordered PRN for nausea       Leukocytosis  WBC 27.80 on admission  Patient afebrile   Lactic acid negative   Was started on sepsis protocol given 30 mg/kg NS   Will continue Vanc, Cefepime and Flagyl while pending blood and urine cultures      Angiomyolipoma of left kidney       US of Abdomen found small left renal angiomyolipoma       Will continue to monitor outpatient     VTE Risk Mitigation (From admission, onward)         Ordered     IP VTE LOW RISK PATIENT  Once      01/31/20 0044     Place sequential compression device  Until discontinued      01/31/20 0044                Dispo: Pending cultures    Chikis Schneider, PGY-2  [facility]  01/31/2020 9:19 AM

## 2020-01-31 NOTE — H&P
[facility] Medicine  History & Physical    Patient Name: Shari Carpenter  MRN: [id number]  Admission Date: 1/30/2020  Attending Physician: Tylor Hernandez III, MD   Primary Care Provider: Lilia Woodard MD         Patient information was obtained from patient and ER records.     Subjective:     Principal Problem:Cystitis    Chief Complaint:   Chief Complaint   Patient presents with    Abdominal Pain     Reports abdominal pain and bloating since Sun. Reports pain worse today and now having dizziness. Also reports dysuria and hematuria.         HPI: Shari Carpenter is a 64 y.o. female with significant medical history of arthritis, migraine headaches, and osteoporosis who presents to [facility] ED for evaluation of progressively  worsening upper abdominal pain for two days.  She has associated nausea and vomiting that has resolved.  Her abdominal pain is exacerbated by movement. She denies any food association with abdominal pain. She denies trying any unusual foods, any raw foods or old foods.    She also complains of a throbbing bifrontal headache that began earlier today typical of her migraines.   She denies history of abdominal surgeries.  She  notes frequency, urgency and dysuria.  She denies diarrhea, constipation,  blood in her stools and urine.    In the ED, /71, ,  leukocytosis of 27.80, the patient is on lico prednisone.  US abdomen showed no acute sonographic abnormality. CT abdomen pelvis significant for urinary bladder wall thickening could represent cystitis. Urinalysis positive for leukocytes and moderate bacteria. Given the presentation to the ED the patient was started on Sepsis protocol. She was given 30 mg/kg NS and started on Zosyn. General surgery was consulted and saw no need for medical intervention as the gall bladder was unremarkable on imaging. Further lab work up showed BUN/Cr elevated 29/1.8, Albumin 2.8, total Bilirubin 1.9, Alk phos 501, AST 59, ALT 27,  GFR 30    Past Medical History:   Diagnosis Date    Arthritis     Migraine headache     Osteoporosis        Past Surgical History:   Procedure Laterality Date    SHOULDER SURGERY Right        Review of patient's allergies indicates:  No Known Allergies    No current facility-administered medications on file prior to encounter.      Current Outpatient Medications on File Prior to Encounter   Medication Sig    ALPRAZolam (XANAX) 0.25 MG tablet Take 0.25 mg by mouth 3 (three) times daily.    calcium carbonate-vitamin D3 500 mg(1,250mg) -400 unit Chew Take 1 tablet by mouth.    diclofenac sodium (VOLTAREN) 1 % Gel     DULoxetine (CYMBALTA) 30 MG capsule Take 1 capsule (30 mg total) by mouth once daily.    ergocalciferol (ERGOCALCIFEROL) 50,000 unit Cap Take 1 capsule once a week for 12 weeks    gabapentin (NEURONTIN) 400 MG capsule     HYDROcodone-acetaminophen (NORCO)  mg per tablet TAKE ONE TABLET BY MOUTH TWICE DAILY AS NEEDED FOR PAIN FOR 30 DAYS    hydrOXYzine pamoate (VISTARIL) 25 MG Cap Take 25 mg by mouth nightly as needed.    naproxen (NAPROSYN) 500 MG tablet     ondansetron (ZOFRAN-ODT) 4 MG TbDL Take 1 tablet (4 mg total) by mouth every 8 (eight) hours as needed.    oxyCODONE-acetaminophen (PERCOCET) 5-325 mg per tablet Take 1 tablet by mouth every 4 (four) hours as needed for Pain.    predniSONE (DELTASONE) 20 MG tablet Take 2 tablets (40 mg total) by mouth once daily.    sumatriptan (IMITREX) 100 MG tablet Take 100 mg by mouth every 2 (two) hours as needed.    tiZANidine (ZANAFLEX) 4 MG tablet Take 4 mg by mouth 2 (two) times daily.    VITAMIN D2 50,000 unit capsule TAKE ONE CAPSULE BY MOUTH EVERY WEEK FOR 12 WEEKS     Family History     None        Tobacco Use    Smoking status: Never Smoker    Smokeless tobacco: Never Used   Substance and Sexual Activity    Alcohol use: No    Drug use: Not on file    Sexual activity: Not on file     Review of Systems   Constitutional:  Negative for activity change, chills and fever.   HENT: Negative for congestion, ear pain, sinus pressure and sinus pain.    Eyes: Negative for visual disturbance.   Respiratory: Negative for cough, choking, chest tightness and shortness of breath.    Cardiovascular: Negative for chest pain and leg swelling.   Gastrointestinal: Positive for abdominal pain, nausea (resolved) and vomiting (resolved). Negative for constipation and diarrhea.   Genitourinary: Positive for dysuria, frequency and urgency. Negative for difficulty urinating and flank pain.   Musculoskeletal: Negative for back pain, myalgias, neck pain and neck stiffness.   Skin: Negative for wound.   Neurological: Positive for headaches (migraines). Negative for dizziness, light-headedness and numbness.     Objective:     Vital Signs (Most Recent):  Temp: 98 °F (36.7 °C) (01/30/20 1901)  Pulse: 97 (01/30/20 2055)  Resp: (!) 25 (01/30/20 2055)  BP: (!) 141/65 (01/30/20 2055)  SpO2: (!) 94 % (01/30/20 2055) Vital Signs (24h Range):  Temp:  [97.5 °F (36.4 °C)-98 °F (36.7 °C)] 98 °F (36.7 °C)  Pulse:  [] 97  Resp:  [15-25] 25  SpO2:  [93 %-100 %] 94 %  BP: (104-150)/(55-73) 141/65     Weight: 55.8 kg (123 lb)  Body mass index is 23.24 kg/m².    Physical Exam   Constitutional: She is oriented to person, place, and time. She appears well-developed and well-nourished.   HENT:   Head: Normocephalic and atraumatic.   Right Ear: External ear normal.   Left Ear: External ear normal.   Nose: Nose normal.   Mouth/Throat: Oropharynx is clear and moist.   Eyes: Conjunctivae and EOM are normal.   Neck: Normal range of motion. Neck supple.   Cardiovascular: Normal rate, regular rhythm, normal heart sounds and intact distal pulses.   Pulmonary/Chest: Effort normal and breath sounds normal. No respiratory distress.   Abdominal: Soft. Bowel sounds are normal. She exhibits no distension, no fluid wave and no mass. There is no hepatosplenomegaly. There is generalized  tenderness. There is guarding (voluntary ). There is no rigidity, no rebound, no CVA tenderness, no tenderness at McBurney's point and negative Peñaloza's sign.   Musculoskeletal: Normal range of motion. She exhibits no edema.   Neurological: She is alert and oriented to person, place, and time.   Skin: Skin is warm and dry. Capillary refill takes less than 2 seconds.   Psychiatric: She has a normal mood and affect.   Nursing note and vitals reviewed.        CRANIAL NERVES     CN III, IV, VI   Extraocular motions are normal.        Significant Labs:   CBC:   Recent Labs   Lab 01/30/20  1623   WBC 27.80*   HGB 9.6*   HCT 28.4*        CMP:   Recent Labs   Lab 01/30/20  1623   *   K 4.2      CO2 23   GLU 82   BUN 29*   CREATININE 1.8*   CALCIUM 9.4   PROT 6.6   ALBUMIN 2.8*   BILITOT 1.9*   ALKPHOS 501*   AST 59*   ALT 27   ANIONGAP 8   EGFRNONAA 30*     Lactic Acid:   Recent Labs   Lab 01/30/20  1635   LACTATE 1.7     Lipase:   Recent Labs   Lab 01/30/20  1623   LIPASE 6     Urine Studies:   Recent Labs   Lab 01/30/20  1623   COLORU Yellow   APPEARANCEUA Cloudy*   PHUR 6.0   SPECGRAV >=1.030*   PROTEINUA 2+*   GLUCUA Negative   KETONESU Negative   BILIRUBINUA 1+*   OCCULTUA 1+*   NITRITE Negative   UROBILINOGEN Negative   LEUKOCYTESUR 2+*   RBCUA 10*   WBCUA 20*   BACTERIA Moderate*   HYALINECASTS 0     All pertinent labs within the past 24 hours have been reviewed.    Significant Imaging:   CT Abdomen Pelvis  Without Contrast   Final Result      1. Urinary bladder wall thickening could represent cystitis.  Recommend clinical correlation.   2. Minimal nonobstructing nephrolithiasis at the lower pole of the right kidney with probable mild right renal cortical scarring.   3. Mild probable bibasilar atelectasis.         Electronically signed by: Pino Nichols   Date:    01/30/2020   Time:    21:01      US Abdomen Limited   Final Result      1. No acute sonographic abnormality.   2. Small probable left  renal angiomyolipoma.  Recommend surveillance.         Electronically signed by: Pino Nichols   Date:    01/30/2020   Time:    19:42      X-Ray Chest AP Portable   Final Result      Mild bibasilar atelectatic changes         Electronically signed by: Toma Llanos MD   Date:    01/30/2020   Time:    16:41           I have reviewed and interpreted all pertinent imaging results/findings within the past 24 hours.    Assessment/Plan:     * Cystitis  Patient with dysuria, frequency and urgency   Urinalysis with 2 + leukocytes   UA with 20 + WBC, moderate bacteria and granular cast   Ucx pending   CT abdomen and pelvis concerning for Urinary bladder wall thickening could represent cystitis  There are no signs or symptoms that suggest an upper tract or systemic infection  Will Start sulfamethoxazole/ trimethoprim 160 mg PO q 12 hours x 3 days       Transaminitis  Alk Phos 501  Will order GGT to confirm hepatic origin   AST 57  TBili 1.9  US abdomen unremarkable  CT abdomen Pelvis not concerning for Gall stones   Gen Surg (Ruff[place]) was consulted by ED, no intervention at this time needed   PT INR/ PTT pending   Hepatitis panel pending         Acute kidney insufficiency  BUN/Cr 29/1.8  Baseline Cr 0.7   Will give MIVF 150 cc/hr 12 hours   Daily CMP to reassess kidney function   Monitor Urinary output   Renally dose medications   Avoid nephrotoxic medications       Nausea  Mostly resolved   Zofran and Phenergan ordered PRN for nausea       Leukocytosis  Likely secondary to home steroid use   WBC 27.80   Patient afebrile   Lactic acid negative   Was started on sepsis protocol given 30 mg/kg NS   Started on Zosyn   Systemic infection less likely will discontinue Zosyn         VTE Risk Mitigation (From admission, onward)    None        Code: Full  Diet: NPO  Disposition: Pending clinical improvement of symptoms.       D'Amico C Johnson, MD  Department of Hospital Medicine   [facility]

## 2020-01-31 NOTE — PLAN OF CARE
Patient awake, alert, OX4. RA; saturation fine. NSR on tele monitor. Patient arrived to floor around 11pm last night. Only complaint from patient was some abd pain, however, the ER nurse had just given her some morphine and she stated that it helped. LR started per MAR. Medications given per MAR. Call light within reach, bed locked and low, patient updated on POC. WCTM.

## 2020-01-31 NOTE — ASSESSMENT & PLAN NOTE
Patient with dysuria, frequency and urgency   WBC: 27  Urinalysis with 2 + leukocytes   UA with 20 + WBC, moderate bacteria and granular cast   Ucx pending   CT abdomen and pelvis concerning for Urinary bladder wall thickening could represent cystitis  There are no signs or symptoms that suggest an upper tract or systemic infection  Will continue Vancomycin, Cefepime and Flagyl pending blood cultures

## 2020-01-31 NOTE — HPI
Shari Carpenter is a 64 y.o. female with significant medical history of arthritis, migraine headaches, and osteoporosis who presents to [facility] ED for evaluation of progressively  worsening upper abdominal pain for two days.  She has associated nausea and vomiting that has resolved.  Her abdominal pain is exacerbated by movement. She denies any food association with abdominal pain. She denies trying any unusual foods, any raw foods or old foods.    She also complains of a throbbing bifrontal headache that began earlier today typical of her migraines.   She denies history of abdominal surgeries.  She notes frequency, urgency and dysuria.  She denies diarrhea, constipation,  blood in her stools and urine.    In the ED, /71, ,  leukocytosis of 27.80, the patient is on lico prednisone.  US abdomen showed no acute sonographic abnormality. CT abdomen pelvis significant for urinary bladder wall thickening could represent cystitis. Urinalysis positive for leukocytes and moderate bacteria. Given the presentation to the ED the patient was started on Sepsis protocol. She was given 30 mg/kg NS and started on Zosyn. General surgery was consulted and saw no need for medical intervention as the gall bladder was unremarkable on imaging. Further lab work up showed BUN/Cr elevated 29/1.8, Albumin 2.8, total Bilirubin 1.9, Alk phos 501, AST 59, ALT 27, GFR 30

## 2020-01-31 NOTE — SUBJECTIVE & OBJECTIVE
Interval History: NAEON. Patient continues to have LUQ pain. Patient reports that she has not been taking Prednisone. Patient was NPO overnight. Denies any nausea or vomiting. Patient continues to have dysuria.     Review of Systems   Constitutional: Negative for activity change, chills and fever.   HENT: Negative for congestion, ear pain, sinus pressure and sinus pain.    Eyes: Negative for visual disturbance.   Respiratory: Negative for cough, choking, chest tightness and shortness of breath.    Cardiovascular: Negative for chest pain and leg swelling.   Gastrointestinal: Positive for abdominal pain. Negative for constipation, diarrhea, nausea and vomiting.   Genitourinary: Positive for dysuria, frequency and urgency. Negative for difficulty urinating and flank pain.   Musculoskeletal: Negative for back pain, myalgias, neck pain and neck stiffness.   Skin: Negative for wound.   Neurological: Negative for dizziness, light-headedness, numbness and headaches.     Objective:     Vital Signs (Most Recent):  Temp: 97.1 °F (36.2 °C) (01/31/20 0723)  Pulse: (!) 112 (01/31/20 0730)  Resp: 18 (01/31/20 0723)  BP: 123/64 (01/31/20 0723)  SpO2: (!) 92 % (01/31/20 0827) Vital Signs (24h Range):  Temp:  [97.1 °F (36.2 °C)-98 °F (36.7 °C)] 97.1 °F (36.2 °C)  Pulse:  [] 112  Resp:  [15-25] 18  SpO2:  [89 %-100 %] 92 %  BP: (104-150)/(55-73) 123/64     Weight: 60.4 kg (133 lb 2.5 oz)  Body mass index is 25.16 kg/m².    Intake/Output Summary (Last 24 hours) at 1/31/2020 0904  Last data filed at 1/31/2020 0800  Gross per 24 hour   Intake 1940 ml   Output 700 ml   Net 1240 ml      Physical Exam   Constitutional: She is oriented to person, place, and time. She appears well-developed and well-nourished.   HENT:   Head: Normocephalic and atraumatic.   Nose: Nose normal.   Mouth/Throat: Oropharynx is clear and moist.   Eyes: Conjunctivae and EOM are normal.   Neck: Normal range of motion. Neck supple.   Cardiovascular: Normal rate,  regular rhythm, normal heart sounds and intact distal pulses.   Pulmonary/Chest: Effort normal and breath sounds normal. No respiratory distress.   Abdominal: Soft. Bowel sounds are normal. She exhibits no distension, no fluid wave and no mass. There is no hepatosplenomegaly. There is generalized tenderness (LUQ). There is no rigidity, no rebound, no guarding, no CVA tenderness, no tenderness at McBurney's point and negative Peñaloza's sign.   Musculoskeletal: Normal range of motion. She exhibits no edema.   Neurological: She is alert and oriented to person, place, and time.   Skin: Skin is warm and dry. Capillary refill takes less than 2 seconds.   Psychiatric: She has a normal mood and affect.   Nursing note and vitals reviewed.      Significant Labs:   Blood Culture:   Recent Labs   Lab 01/30/20  1708 01/30/20  1715   LABBLOO No Growth to date No Growth to date     CBC:   Recent Labs   Lab 01/30/20  1623 01/31/20  0622   WBC 27.80* 25.78*   HGB 9.6* 9.0*   HCT 28.4* 27.6*    314     CMP:   Recent Labs   Lab 01/30/20  1623 01/31/20  0622   * 137   K 4.2 3.8    108   CO2 23 21*   GLU 82 88   BUN 29* 29*   CREATININE 1.8* 1.7*   CALCIUM 9.4 8.8   PROT 6.6 5.5*   ALBUMIN 2.8* 2.1*   BILITOT 1.9* 1.6*   ALKPHOS 501* 446*   AST 59* 32   ALT 27 21   ANIONGAP 8 8   EGFRNONAA 30* 32*     Lactic Acid:   Recent Labs   Lab 01/30/20  1635   LACTATE 1.7       Significant Imaging:   Imaging Results          CT Abdomen Pelvis  Without Contrast (Final result)  Result time 01/30/20 21:01:58    Final result by Pino Gordon MD (01/30/20 21:01:58)                 Impression:      1. Urinary bladder wall thickening could represent cystitis.  Recommend clinical correlation.  2. Minimal nonobstructing nephrolithiasis at the lower pole of the right kidney with probable mild right renal cortical scarring.  3. Mild probable bibasilar atelectasis.      Electronically signed by: Pino  Simone  Date:    01/30/2020  Time:    21:01             Narrative:    EXAMINATION:  CT ABDOMEN PELVIS WITHOUT CONTRAST    CLINICAL HISTORY:  Infection, abdomen-pelvis;    TECHNIQUE:  Low dose axial images, sagittal and coronal reformations were obtained from the lung bases to the pubic symphysis.  The lack of IV and bowel contrast may diminish the sensitivity.    COMPARISON:  None    FINDINGS:  Mild probable bibasilar atelectasis.    No focal abnormality of the liver or spleen.    Aorta is normal in caliber.    No calcified gallstones are detected.  The gallbladder is mildly distended.    Bile ducts appear within normal limits.    Mild atrophy of the pancreas.    No evidence of bowel obstruction.    No adenopathy or ascites.    Left kidney is within normal limits.    Mild probable right renal cortical scarring.  Few tiny right lower pole calcifications could represent minimal nephrolithiasis.  No hydronephrosis or hydroureter bilaterally.    Urinary bladder is incompletely distended.  Mild diffuse wall thickening is noted.  Cystitis is a consideration.    Status post hysterectomy.    Degenerative Schmorl's node with mild loss of height at superior endplate of L2 which appears chronic.                               US Abdomen Limited (Final result)  Result time 01/30/20 19:42:53    Final result by Pino Gordon MD (01/30/20 19:42:53)                 Impression:      1. No acute sonographic abnormality.  2. 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  Recommend surveillance.      Electronically signed by: Pino Gordon  Date:    01/30/2020  Time:    19:42             Narrative:    EXAMINATION:  US ABDOMEN LIMITED    CLINICAL HISTORY:  Acute abdominal pain;    TECHNIQUE:  Limited ultrasound of the right upper quadrant of the abdomen (including pancreas, liver, gallbladder, common bile duct, and spleen) was performed.    COMPARISON:  None.    FINDINGS:  Liver: Normal in size, measuring 16 cm. Homogeneous  echotexture. No focal hepatic lesions.    Gallbladder: No stone, wall thickening or pericholecystic fluid.  Negative sonographic Peñaloza sign.    Biliary system: The common duct is not dilated, measuring 4.6 mm.  No intrahepatic ductal dilatation.    Spleen: Normal in size and echotexture, measuring 9.7 cm.    Miscellaneous: No upper abdominal ascites.    No focal abnormality of the pancreas.  The tail is obscured by gas artifact.    The inferior vena cava is present.  No color images.    Probable 1.3 cm peripheral echogenic mass in the midportion of the left kidney is suggestive of a renal angiomyolipoma.    Limited visualization of the right kidney with no focal abnormality identified.  No hydronephrosis bilaterally.                               X-Ray Chest AP Portable (Final result)  Result time 01/30/20 16:41:16    Final result by Toma Llanos MD (01/30/20 16:41:16)                 Impression:      Mild bibasilar atelectatic changes      Electronically signed by: Toma Llanos MD  Date:    01/30/2020  Time:    16:41             Narrative:    EXAMINATION:  XR CHEST AP PORTABLE    CLINICAL HISTORY:  Unspecified abdominal pain    TECHNIQUE:  Single frontal view of the chest was performed.    COMPARISON:  11/02/2017    FINDINGS:  The cardiac silhouette is normal in size.  The pulmonary vascularity appears normal.  Low lung volume, poor inspiratory effort.  I suspect minimal bibasilar atelectasis.  No large area of focal airspace consolidation.  No pleural effusion.  No pneumothorax.  Postsurgical changes of the right proximal humerus.

## 2020-01-31 NOTE — ASSESSMENT & PLAN NOTE
Likely secondary to home steroid use   WBC 27.80   Patient afebrile   Lactic acid negative   Was started on sepsis protocol given 30 mg/kg NS   Started on Zosyn   Systemic infection less likely will discontinue Zosyn

## 2020-01-31 NOTE — ASSESSMENT & PLAN NOTE
WBC 27.80 on admission  Patient afebrile   Lactic acid negative   Was started on sepsis protocol given 30 mg/kg NS   Will continue Vanc, Cefepime and Flagyl while pending blood and urine cultures

## 2020-01-31 NOTE — ED TRIAGE NOTES
Pt. Care assumed, pt. Is awake, alert and oriented. Skin is PWD. Denies c/o nausea at this time. Rates pain a 5 on 0-10 pain scale. Bed in the low position, side rails elevated x 2 and call light at the bedside. Pt. And family updated on the plan of care. Will continue to monitor.

## 2020-01-31 NOTE — ASSESSMENT & PLAN NOTE
BUN/Cr 29/1.8  Baseline Cr 0.7   Will give MIVF 150 cc/hr 12 hours   Daily CMP to reassess kidney function   Monitor Urinary output   Renally dose medications   Avoid nephrotoxic medications

## 2020-01-31 NOTE — ASSESSMENT & PLAN NOTE
Alk Phos 501, GGT: 172, AST 59, TBili 1.9 on admission  US abdomen unremarkable  CT abdomen Pelvis not concerning for Gall stones   Gen Surg (Boby) was consulted by ED, no intervention at this time needed   Hepatitis panel pending

## 2020-01-31 NOTE — ASSESSMENT & PLAN NOTE
Patient with dysuria, frequency and urgency   Urinalysis with 2 + leukocytes   UA with 20 + WBC, moderate bacteria and granular cast   Ucx pending   CT abdomen and pelvis concerning for Urinary bladder wall thickening could represent cystitis  There are no signs or symptoms that suggest an upper tract or systemic infection  Will Start sulfamethoxazole/ trimethoprim 160 mg PO q 12 hours x 3 days

## 2020-01-31 NOTE — ED NOTES
Pt. Updated on the plan of care that a CT scan was ordered and that she is NPO at this time. Pt. Verbalized understanding.

## 2020-01-31 NOTE — PROGRESS NOTES
Pharmacist Renal Dose Adjustment Note    Shari Carpenter is a 64 y.o. female being treated with the medication ciprofloxacin    Patient Data:    Vital Signs (Most Recent):  Temp: 97.1 °F (36.2 °C) (01/31/20 0723)  Pulse: (!) 112 (01/31/20 0730)  Resp: 18 (01/31/20 0723)  BP: 123/64 (01/31/20 0723)  SpO2: (!) 92 % (01/31/20 0827)   Vital Signs (72h Range):  Temp:  [97.1 °F (36.2 °C)-98 °F (36.7 °C)]   Pulse:  []   Resp:  [15-25]   BP: (104-150)/(55-73)   SpO2:  [89 %-100 %]      Recent Labs   Lab 01/30/20  1623 01/31/20 0622   CREATININE 1.8* 1.7*     Serum creatinine: 1.7 mg/dL (H) 01/31/20 0622  Estimated creatinine clearance: 28.6 mL/min (A)    Medication:ciprofloxacin dose: 400mg frequency q12 will be changed to medication:ciprofloxacin dose:400mg frequency:q24h    Pharmacist's Name: Noah Hawkins  Pharmacist's Extension: [phone]

## 2020-02-01 VITALS
OXYGEN SATURATION: 99 % | HEART RATE: 78 BPM | BODY MASS INDEX: 26.3 KG/M2 | DIASTOLIC BLOOD PRESSURE: 82 MMHG | SYSTOLIC BLOOD PRESSURE: 159 MMHG | HEIGHT: 61 IN | WEIGHT: 139.31 LBS | RESPIRATION RATE: 18 BRPM | TEMPERATURE: 98 F

## 2020-02-01 PROBLEM — R11.0 NAUSEA: Status: RESOLVED | Noted: 2020-01-30 | Resolved: 2020-02-01

## 2020-02-01 LAB
ALBUMIN SERPL BCP-MCNC: 2 G/DL (ref 3.5–5.2)
ALP SERPL-CCNC: 602 U/L (ref 55–135)
ALT SERPL W/O P-5'-P-CCNC: 19 U/L (ref 10–44)
ANION GAP SERPL CALC-SCNC: 9 MMOL/L (ref 8–16)
AST SERPL-CCNC: 27 U/L (ref 10–40)
BASOPHILS # BLD AUTO: 0.05 K/UL (ref 0–0.2)
BASOPHILS NFR BLD: 0.2 % (ref 0–1.9)
BILIRUB SERPL-MCNC: 1.3 MG/DL (ref 0.1–1)
BUN SERPL-MCNC: 27 MG/DL (ref 8–23)
CALCIUM SERPL-MCNC: 8.9 MG/DL (ref 8.7–10.5)
CHLORIDE SERPL-SCNC: 108 MMOL/L (ref 95–110)
CO2 SERPL-SCNC: 21 MMOL/L (ref 23–29)
CREAT SERPL-MCNC: 1.3 MG/DL (ref 0.5–1.4)
DIFFERENTIAL METHOD: ABNORMAL
EOSINOPHIL # BLD AUTO: 0.2 K/UL (ref 0–0.5)
EOSINOPHIL NFR BLD: 0.9 % (ref 0–8)
ERYTHROCYTE [DISTWIDTH] IN BLOOD BY AUTOMATED COUNT: 17.1 % (ref 11.5–14.5)
EST. GFR  (AFRICAN AMERICAN): 51 ML/MIN/1.73 M^2
EST. GFR  (NON AFRICAN AMERICAN): 44 ML/MIN/1.73 M^2
GLUCOSE SERPL-MCNC: 63 MG/DL (ref 70–110)
HCT VFR BLD AUTO: 27.3 % (ref 37–48.5)
HGB BLD-MCNC: 8.9 G/DL (ref 12–16)
LYMPHOCYTES # BLD AUTO: 1.9 K/UL (ref 1–4.8)
LYMPHOCYTES NFR BLD: 9.3 % (ref 18–48)
MAGNESIUM SERPL-MCNC: 2.1 MG/DL (ref 1.6–2.6)
MCH RBC QN AUTO: 23.2 PG (ref 27–31)
MCHC RBC AUTO-ENTMCNC: 32.6 G/DL (ref 32–36)
MCV RBC AUTO: 71 FL (ref 82–98)
MONOCYTES # BLD AUTO: 2.1 K/UL (ref 0.3–1)
MONOCYTES NFR BLD: 10.4 % (ref 4–15)
NEUTROPHILS # BLD AUTO: 15.6 K/UL (ref 1.8–7.7)
NEUTROPHILS NFR BLD: 79.2 % (ref 38–73)
PHOSPHATE SERPL-MCNC: 3.5 MG/DL (ref 2.7–4.5)
PLATELET # BLD AUTO: 364 K/UL (ref 150–350)
PMV BLD AUTO: 10.1 FL (ref 9.2–12.9)
POTASSIUM SERPL-SCNC: 3.6 MMOL/L (ref 3.5–5.1)
PROT SERPL-MCNC: 5.5 G/DL (ref 6–8.4)
RBC # BLD AUTO: 3.84 M/UL (ref 4–5.4)
SODIUM SERPL-SCNC: 138 MMOL/L (ref 136–145)
WBC # BLD AUTO: 20.37 K/UL (ref 3.9–12.7)

## 2020-02-01 PROCEDURE — 83735 ASSAY OF MAGNESIUM: CPT

## 2020-02-01 PROCEDURE — 25000003 PHARM REV CODE 250: Performed by: STUDENT IN AN ORGANIZED HEALTH CARE EDUCATION/TRAINING PROGRAM

## 2020-02-01 PROCEDURE — 94799 UNLISTED PULMONARY SVC/PX: CPT

## 2020-02-01 PROCEDURE — 85025 COMPLETE CBC W/AUTO DIFF WBC: CPT

## 2020-02-01 PROCEDURE — 84100 ASSAY OF PHOSPHORUS: CPT

## 2020-02-01 PROCEDURE — 94664 DEMO&/EVAL PT USE INHALER: CPT

## 2020-02-01 PROCEDURE — 94761 N-INVAS EAR/PLS OXIMETRY MLT: CPT

## 2020-02-01 PROCEDURE — 36415 COLL VENOUS BLD VENIPUNCTURE: CPT

## 2020-02-01 PROCEDURE — G0378 HOSPITAL OBSERVATION PER HR: HCPCS

## 2020-02-01 PROCEDURE — 63600175 PHARM REV CODE 636 W HCPCS: Performed by: STUDENT IN AN ORGANIZED HEALTH CARE EDUCATION/TRAINING PROGRAM

## 2020-02-01 PROCEDURE — 96376 TX/PRO/DX INJ SAME DRUG ADON: CPT

## 2020-02-01 PROCEDURE — 99900035 HC TECH TIME PER 15 MIN (STAT)

## 2020-02-01 PROCEDURE — 80053 COMPREHEN METABOLIC PANEL: CPT

## 2020-02-01 RX ORDER — POLYETHYLENE GLYCOL 3350 17 G/17G
17 POWDER, FOR SOLUTION ORAL DAILY
Qty: 30 EACH | Refills: 2 | Status: SHIPPED | OUTPATIENT
Start: 2020-02-01 | End: 2020-02-10

## 2020-02-01 RX ORDER — FERROUS SULFATE 325(65) MG
325 TABLET ORAL
Qty: 30 TABLET | Refills: 5 | Status: SHIPPED | OUTPATIENT
Start: 2020-02-01 | End: 2020-02-10

## 2020-02-01 RX ORDER — CIPROFLOXACIN 250 MG/1
250 TABLET, FILM COATED ORAL EVERY 12 HOURS
Qty: 6 TABLET | Refills: 0 | Status: SHIPPED | OUTPATIENT
Start: 2020-02-01 | End: 2020-02-04

## 2020-02-01 RX ADMIN — CIPROFLOXACIN 400 MG: 2 INJECTION, SOLUTION INTRAVENOUS at 11:02

## 2020-02-01 RX ADMIN — PANTOPRAZOLE SODIUM 40 MG: 40 TABLET, DELAYED RELEASE ORAL at 08:02

## 2020-02-01 RX ADMIN — HYDROCODONE BITARTRATE AND ACETAMINOPHEN 1 TABLET: 5; 325 TABLET ORAL at 08:02

## 2020-02-01 RX ADMIN — HYDROCODONE BITARTRATE AND ACETAMINOPHEN 1 TABLET: 5; 325 TABLET ORAL at 02:02

## 2020-02-01 RX ADMIN — HYDROCODONE BITARTRATE AND ACETAMINOPHEN 1 TABLET: 5; 325 TABLET ORAL at 12:02

## 2020-02-01 NOTE — ASSESSMENT & PLAN NOTE
WBC 27.80 on admission  Patient afebrile   Lactic acid negative   Was started on sepsis protocol given 30 mg/kg NS in the ED   Previously transitioned to Cipro  WBC downtrending to 20.37 today

## 2020-02-01 NOTE — HOSPITAL COURSE
Patient presented to the ED with complaints of severe abdominal pain and nausea/vomiting. Bloodwork in the ED showed marked leukocytosis to 27. Patient is on prednisone at home which may explain the leukocytosis. Patient was started on sepsis protocol in the ED and started on zosyn. On labs, elevated alk. Phos., bilirubin, and GGT suggestive of possible cholelithiasis. General surgery was consulted but declined any surgical intervention at this time. Abd CT revealed cystitis and possible non-obstructing nephrolithiasis but no evidence suggestive of cholelithiasis/cholecystitis. Urine culture drawn in the ED growing E. Coli. Blood cultures without any growth. Transaminitis resolved. Patient's antibiotics were narrowed to IV ciprofloxacin. Patient tolerated PO. On the day of discharge, leukocytosis decreased to about 20 and the patient reported that her abdominal pain was markedly improved. For more information, please see below from day of discharge:    * Cystitis  Patient with dysuria, frequency and urgency on presentation  WBC: 27 on admission, down to 20.37 today  Urinalysis with 2 + leukocytes ; UA with 20 + WBC, moderate bacteria and granular cast   CT abdomen and pelvis concerning for Urinary bladder wall thickening could represent cystitis  There are no signs or symptoms that suggest an upper tract or systemic infection  UCx grew E. Coli  Transitioned to Cipro yesterday  Patient reports that abdominal pain is much improved today        Leukocytosis  WBC 27.80 on admission  Patient afebrile   Lactic acid negative   Was started on sepsis protocol given 30 mg/kg NS in the ED   Previously transitioned to Cipro  WBC downtrending to 20.37 today        Transaminitis  Alk Phos 501, GGT: 172, AST 59, TBili 1.9 on admission  US abdomen unremarkable  CT abdomen Pelvis not concerning for Gall stones   Gen Surg (Boby) was consulted by ED, no intervention at this time needed   Hepatitis panel negative        Acute  kidney insufficiency  BUN/Cr 27/1.3  Baseline Cr 0.7   Will give MIVF 150 cc/hr 12 hours   Daily CMP to reassess kidney function   Monitor Urinary output   Renally dose medications   Avoid nephrotoxic medications         Nausea  Resolved   Zofran and Phenergan ordered PRN for nausea         Angiomyolipoma of left kidney  US of Abdomen  Will continue to monitor outpatient

## 2020-02-01 NOTE — ASSESSMENT & PLAN NOTE
Patient with dysuria, frequency and urgency on presentation  WBC: 27 on admission, down to 20.37 today  Urinalysis with 2 + leukocytes ; UA with 20 + WBC, moderate bacteria and granular cast   CT abdomen and pelvis concerning for Urinary bladder wall thickening could represent cystitis  There are no signs or symptoms that suggest an upper tract or systemic infection  UCx grew E. Coli, sensitivity pending  Transitioned to Cipro  Patient reports that abdominal pain is much improved

## 2020-02-01 NOTE — PLAN OF CARE
Patient awake, alert, OX4. NSR on monitor. RA; saturation fine. No acute events overnight. Treated pain X1; per MAR. AUO, patient walks by herself fine. Bed locked and low, medications given per MAR, WCTM.

## 2020-02-01 NOTE — ASSESSMENT & PLAN NOTE
Alk Phos 501, GGT: 172, AST 59, TBili 1.9 on admission  US abdomen unremarkable  CT abdomen Pelvis not concerning for Gall stones   Gen Surg (Boby) was consulted by ED, no intervention at this time needed   Hepatitis panel negative

## 2020-02-01 NOTE — DISCHARGE SUMMARY
[facility] Hospital Medicine  Discharge Summary      Patient Name: Shari Carpenter  MRN: [id number]  Admission Date: 1/30/2020  Hospital Length of Stay: 0 days  Discharge Date and Time:  02/01/2020 1:20 PM  Attending Physician: Yenny Felix MD   Discharging Provider: Jayden Houston MD  Primary Care Provider: Lilia Woodard MD      HPI:   Shari Carpenter is a 64 y.o. female with significant medical history of arthritis, migraine headaches, and osteoporosis who presents to [facility] ED for evaluation of progressively  worsening upper abdominal pain for two days.  She has associated nausea and vomiting that has resolved.  Her abdominal pain is exacerbated by movement. She denies any food association with abdominal pain. She denies trying any unusual foods, any raw foods or old foods.    She also complains of a throbbing bifrontal headache that began earlier today typical of her migraines.   She denies history of abdominal surgeries.  She  notes frequency, urgency and dysuria.  She denies diarrhea, constipation,  blood in her stools and urine.    In the ED, /71, ,  leukocytosis of 27.80, the patient is on lico prednisone.  US abdomen showed no acute sonographic abnormality. CT abdomen pelvis significant for urinary bladder wall thickening could represent cystitis. Urinalysis positive for leukocytes and moderate bacteria. Given the presentation to the ED the patient was started on Sepsis protocol. She was given 30 mg/kg NS and started on Zosyn. General surgery was consulted and saw no need for medical intervention as the gall bladder was unremarkable on imaging. Further lab work up showed BUN/Cr elevated 29/1.8, Albumin 2.8, total Bilirubin 1.9, Alk phos 501, AST 59, ALT 27, GFR 30    * No surgery found *      Hospital Course:   Patient presented to the ED with complaints of severe abdominal pain and nausea/vomiting. Bloodwork in the ED showed marked leukocytosis to 27. Patient is on  prednisone at home which may explain the leukocytosis. Patient was started on sepsis protocol in the ED and started on zosyn. On labs, elevated alk. Phos., bilirubin, and GGT suggestive of possible cholelithiasis. General surgery was consulted but declined any surgical intervention at this time. Abd CT revealed cystitis and possible non-obstructing nephrolithiasis but no evidence suggestive of cholelithiasis/cholecystitis. Urine culture drawn in the ED growing E. Coli. Blood cultures without any growth. Transaminitis resolved. Patient's antibiotics were narrowed to IV ciprofloxacin. Patient tolerated PO. On the day of discharge, leukocytosis decreased to about 20 and the patient reported that her abdominal pain was markedly improved. For more information, please see below from day of discharge:    * Cystitis  Patient with dysuria, frequency and urgency on presentation  WBC: 27 on admission, down to 20.37 today  Urinalysis with 2 + leukocytes ; UA with 20 + WBC, moderate bacteria and granular cast   CT abdomen and pelvis concerning for Urinary bladder wall thickening could represent cystitis  There are no signs or symptoms that suggest an upper tract or systemic infection  UCx grew E. Coli  Transitioned to Cipro yesterday  Patient reports that abdominal pain is much improved today        Leukocytosis  WBC 27.80 on admission  Patient afebrile   Lactic acid negative   Was started on sepsis protocol given 30 mg/kg NS in the ED   Previously transitioned to Cipro  WBC downtrending to 20.37 today        Transaminitis  Alk Phos 501, GGT: 172, AST 59, TBili 1.9 on admission  US abdomen unremarkable  CT abdomen Pelvis not concerning for Gall stones   Gen Surg (Boby) was consulted by ED, no intervention at this time needed   Hepatitis panel negative        Acute kidney insufficiency  BUN/Cr 27/1.3  Baseline Cr 0.7   Will give MIVF 150 cc/hr 12 hours   Daily CMP to reassess kidney function   Monitor Urinary output   Renally  dose medications   Avoid nephrotoxic medications         Nausea  Resolved   Zofran and Phenergan ordered PRN for nausea         Angiomyolipoma of left kidney  US of Abdomen  Will continue to monitor outpatient     Consults:   Consults (From admission, onward)        Status Ordering Provider     Case Management  Once     Provider:  (Not yet assigned)    Acknowledged JOHNSON, D'AMICO C.     Inpatient consult to Social Work/Case Management  Once     Provider:  (Not yet assigned)    Acknowledged SUNITHA SALOMON III          No new Assessment & Plan notes have been filed under this hospital service since the last note was generated.  Service: Hospital Medicine    Final Active Diagnoses:    Diagnosis Date Noted POA    PRINCIPAL PROBLEM:  Cystitis [N30.90] 01/30/2020 Yes    Leukocytosis [D72.829] 11/02/2017 Yes    Transaminitis [R74.0] 01/30/2020 Yes    Acute kidney insufficiency [N28.9] 01/30/2020 Yes    Angiomyolipoma of left kidney [D17.71] 01/31/2020 Yes      Problems Resolved During this Admission:    Diagnosis Date Noted Date Resolved POA    Nausea [R11.0] 01/30/2020 02/01/2020 Yes       Discharged Condition: stable    Disposition: Home or Self Care    Follow Up:  Follow-up Information     Lilia Woodard MD On 2/10/2020.    Specialty:  Family Medicine  Why:  8:40 AM , For Hospital Follow-up  Contact information:  [address]  [address] [address] [phone]                 Patient Instructions:      Diet Adult Regular     Notify your health care provider if you experience any of the following:  temperature >100.4     Notify your health care provider if you experience any of the following:  persistent nausea and vomiting or diarrhea     Notify your health care provider if you experience any of the following:  severe uncontrolled pain     Notify your health care provider if you experience any of the following:  difficulty breathing or increased cough     Notify your health care provider if  you experience any of the following:  persistent dizziness, light-headedness, or visual disturbances     Notify your health care provider if you experience any of the following:  increased confusion or weakness     Activity as tolerated       Significant Diagnostic Studies: Labs:   CMP   Recent Labs   Lab 01/30/20  1623 01/31/20  0622 02/01/20  0436   * 137 138   K 4.2 3.8 3.6    108 108   CO2 23 21* 21*   GLU 82 88 63*   BUN 29* 29* 27*   CREATININE 1.8* 1.7* 1.3   CALCIUM 9.4 8.8 8.9   PROT 6.6 5.5* 5.5*   ALBUMIN 2.8* 2.1* 2.0*   BILITOT 1.9* 1.6* 1.3*   ALKPHOS 501* 446* 602*   AST 59* 32 27   ALT 27 21 19   ANIONGAP 8 8 9   ESTGFRAFRICA 34* 37* 51*   EGFRNONAA 30* 32* 44*    and CBC   Recent Labs   Lab 01/30/20  1623 01/31/20  0622 02/01/20  0436   WBC 27.80* 25.78* 20.37*   HGB 9.6* 9.0* 8.9*   HCT 28.4* 27.6* 27.3*    314 364*       Pending Diagnostic Studies:     None         Medications:  Reconciled Home Medications:      Medication List      START taking these medications    ciprofloxacin HCl 250 MG tablet  Commonly known as:  CIPRO  Take 1 tablet (250 mg total) by mouth every 12 (twelve) hours. for 3 days     ferrous sulfate 325 mg (65 mg iron) Tab tablet  Commonly known as:  FEOSOL  Take 1 tablet (325 mg total) by mouth daily with breakfast.     polyethylene glycol 17 gram Pwpk  Commonly known as:  GLYCOLAX  Take 17 g by mouth once daily.        CONTINUE taking these medications    ALPRAZolam 0.25 MG tablet  Commonly known as:  XANAX  Take 0.25 mg by mouth 3 (three) times daily.     calcium carbonate-vitamin D3 500 mg(1,250mg) -400 unit Chew  Take 1 tablet by mouth.     diclofenac sodium 1 % Gel  Commonly known as:  VOLTAREN     DULoxetine 30 MG capsule  Commonly known as:  CYMBALTA  Take 1 capsule (30 mg total) by mouth once daily.     * ergocalciferol 50,000 unit Cap  Commonly known as:  ERGOCALCIFEROL  Take 1 capsule once a week for 12 weeks     * Vitamin D2 50,000 unit Cap  Generic  drug:  ergocalciferol  TAKE ONE CAPSULE BY MOUTH EVERY WEEK FOR 12 WEEKS     gabapentin 400 MG capsule  Commonly known as:  NEURONTIN     HYDROcodone-acetaminophen  mg per tablet  Commonly known as:  NORCO  TAKE ONE TABLET BY MOUTH TWICE DAILY AS NEEDED FOR PAIN FOR 30 DAYS     hydrOXYzine pamoate 25 MG Cap  Commonly known as:  VISTARIL  Take 25 mg by mouth nightly as needed.     naproxen 500 MG tablet  Commonly known as:  NAPROSYN     ondansetron 4 MG Tbdl  Commonly known as:  ZOFRAN-ODT  Take 1 tablet (4 mg total) by mouth every 8 (eight) hours as needed.     sumatriptan 100 MG tablet  Commonly known as:  IMITREX  Take 100 mg by mouth every 2 (two) hours as needed.     tiZANidine 4 MG tablet  Commonly known as:  ZANAFLEX  Take 4 mg by mouth 2 (two) times daily.         * This list has 2 medication(s) that are the same as other medications prescribed for you. Read the directions carefully, and ask your doctor or other care provider to review them with you.            STOP taking these medications    oxyCODONE-acetaminophen 5-325 mg per tablet  Commonly known as:  PERCOCET     predniSONE 20 MG tablet  Commonly known as:  DELTASONE            Indwelling Lines/Drains at time of discharge:   Lines/Drains/Airways     None                 Time spent on the discharge of patient: >30 minutes  Patient was seen and examined on the date of discharge and determined to be suitable for discharge.         Jayden Houston MD PGY-I  Department of Hospital Medicine  [facility]

## 2020-02-01 NOTE — PROGRESS NOTES
[facility] Medicine  Progress Note    Patient Name: Shari Carpenter  MRN: [id number]  Patient Class: OP- Observation   Admission Date: 1/30/2020  Length of Stay: 0 days  Attending Physician: Yenny Felix MD  Primary Care Provider: Lilia Woodard MD        Subjective:     Principal Problem:Cystitis        HPI:  Shari Carpenter is a 64 y.o. female with significant medical history of arthritis, migraine headaches, and osteoporosis who presents to [facility] ED for evaluation of progressively  worsening upper abdominal pain for two days.  She has associated nausea and vomiting that has resolved.  Her abdominal pain is exacerbated by movement. She denies any food association with abdominal pain. She denies trying any unusual foods, any raw foods or old foods.    She also complains of a throbbing bifrontal headache that began earlier today typical of her migraines.   She denies history of abdominal surgeries.  She  notes frequency, urgency and dysuria.  She denies diarrhea, constipation,  blood in her stools and urine.    In the ED, /71, ,  leukocytosis of 27.80, the patient is on lico prednisone.  US abdomen showed no acute sonographic abnormality. CT abdomen pelvis significant for urinary bladder wall thickening could represent cystitis. Urinalysis positive for leukocytes and moderate bacteria. Given the presentation to the ED the patient was started on Sepsis protocol. She was given 30 mg/kg NS and started on Zosyn. General surgery was consulted and saw no need for medical intervention as the gall bladder was unremarkable on imaging. Further lab work up showed BUN/Cr elevated 29/1.8, Albumin 2.8, total Bilirubin 1.9, Alk phos 501, AST 59, ALT 27, GFR 30      Interval History: Patient seen and examined this morning. No acute events overnight. Patient reports that she is feeling much improved from yesterday. Reports that she still has pain but it is nowhere near as bad as it was  previously. Patient denied any other complaints at this time.     Review of Systems   Constitutional: Negative for appetite change, chills, fatigue, fever and unexpected weight change.   HENT: Negative for rhinorrhea, sinus pressure, sinus pain, sneezing and sore throat.    Eyes: Negative for pain, discharge and itching.   Respiratory: Negative for cough, chest tightness, shortness of breath and wheezing.    Cardiovascular: Negative for chest pain and palpitations.   Gastrointestinal: Positive for abdominal distention (improving) and abdominal pain (improving, LLQ). Negative for blood in stool, constipation, diarrhea, nausea and vomiting.   Genitourinary: Positive for dysuria (improving), frequency and urgency. Negative for difficulty urinating, flank pain and hematuria.   Musculoskeletal: Negative for arthralgias, back pain and myalgias.   Skin: Negative for color change and rash.   Neurological: Negative for dizziness, weakness, light-headedness, numbness and headaches.   Psychiatric/Behavioral: Negative for behavioral problems, confusion, decreased concentration and sleep disturbance. The patient is not nervous/anxious.      Objective:     Vital Signs (Most Recent):  Temp: 99.3 °F (37.4 °C) (02/01/20 0333)  Pulse: 92 (02/01/20 0448)  Resp: 20 (02/01/20 0333)  BP: (!) 147/67 (02/01/20 0333)  SpO2: 96 % (02/01/20 0415) Vital Signs (24h Range):  Temp:  [98.7 °F (37.1 °C)-99.8 °F (37.7 °C)] 99.3 °F (37.4 °C)  Pulse:  [] 92  Resp:  [18-22] 20  SpO2:  [92 %-96 %] 96 %  BP: (131-176)/(67-78) 147/67     Weight: 63.2 kg (139 lb 5.3 oz)  Body mass index is 26.33 kg/m².    Intake/Output Summary (Last 24 hours) at 2/1/2020 0734  Last data filed at 2/1/2020 0600  Gross per 24 hour   Intake 890 ml   Output 3300 ml   Net -2410 ml      Physical Exam   Constitutional: She is oriented to person, place, and time. She appears well-developed and well-nourished. No distress.   HENT:   Head: Normocephalic and atraumatic.   Right  Ear: External ear normal.   Left Ear: External ear normal.   Nose: Nose normal.   Mouth/Throat: Oropharynx is clear and moist. No oropharyngeal exudate.   Eyes: Pupils are equal, round, and reactive to light. Conjunctivae and EOM are normal.   Neck: Normal range of motion. Neck supple. No JVD present.   Cardiovascular: Normal rate, regular rhythm, normal heart sounds and intact distal pulses.   No murmur heard.  Pulmonary/Chest: Effort normal and breath sounds normal. No respiratory distress. She has no wheezes.   Abdominal: Soft. Bowel sounds are normal. She exhibits no distension and no mass. There is tenderness (LUQ and LLQ).   Musculoskeletal: Normal range of motion. She exhibits no edema.   Neurological: She is alert and oriented to person, place, and time.   Skin: Skin is warm and dry. Capillary refill takes less than 2 seconds. She is not diaphoretic.   Psychiatric: She has a normal mood and affect. Her behavior is normal.       Significant Labs:   CBC:   Recent Labs   Lab 01/30/20  1623 01/31/20  0622 02/01/20  0436   WBC 27.80* 25.78* 20.37*   HGB 9.6* 9.0* 8.9*   HCT 28.4* 27.6* 27.3*    314 364*     CMP:   Recent Labs   Lab 01/30/20  1623 01/31/20  0622 02/01/20  0436   * 137 138   K 4.2 3.8 3.6    108 108   CO2 23 21* 21*   GLU 82 88 63*   BUN 29* 29* 27*   CREATININE 1.8* 1.7* 1.3   CALCIUM 9.4 8.8 8.9   PROT 6.6 5.5* 5.5*   ALBUMIN 2.8* 2.1* 2.0*   BILITOT 1.9* 1.6* 1.3*   ALKPHOS 501* 446* 602*   AST 59* 32 27   ALT 27 21 19   ANIONGAP 8 8 9   EGFRNONAA 30* 32* 44*     Urine Culture:   Recent Labs   Lab 01/30/20  1623   LABURIN PRESUMPTIVE E COLI  >100,000 cfu/ml  Identification and susceptibility pending  *       Significant Imaging: I have reviewed and interpreted all pertinent imaging results/findings within the past 24 hours.      Assessment/Plan:      * Cystitis  Patient with dysuria, frequency and urgency on presentation  WBC: 27 on admission, down to 20.37 today  Urinalysis  with 2 + leukocytes ; UA with 20 + WBC, moderate bacteria and granular cast   CT abdomen and pelvis concerning for Urinary bladder wall thickening could represent cystitis  There are no signs or symptoms that suggest an upper tract or systemic infection  UCx grew E. Coli, sensitivity pending  Transitioned to Cipro  Patient reports that abdominal pain is much improved      Leukocytosis  WBC 27.80 on admission  Patient afebrile   Lactic acid negative   Was started on sepsis protocol given 30 mg/kg NS in the ED   Previously transitioned to Cipro  WBC downtrending to 20.37 today      Transaminitis  Alk Phos 501, GGT: 172, AST 59, TBili 1.9 on admission  US abdomen unremarkable  CT abdomen Pelvis not concerning for Gall stones   Gen Surg (Boby) was consulted by ED, no intervention at this time needed   Hepatitis panel negative        Acute kidney insufficiency  BUN/Cr 27/1.3  Baseline Cr 0.7   Will give MIVF 150 cc/hr 12 hours   Daily CMP to reassess kidney function   Monitor Urinary output   Renally dose medications   Avoid nephrotoxic medications       Nausea  Resolved   Zofran and Phenergan ordered PRN for nausea       Angiomyolipoma of left kidney  US of Abdomen  Will continue to monitor outpatient      VTE Risk Mitigation (From admission, onward)         Ordered     IP VTE LOW RISK PATIENT  Once      01/31/20 0044     Place sequential compression device  Until discontinued      01/31/20 0044                Jayden Houston MD PGY-I  Department of Hospital Medicine   [facility]

## 2020-02-01 NOTE — ASSESSMENT & PLAN NOTE
BUN/Cr 27/1.3  Baseline Cr 0.7   Will give MIVF 150 cc/hr 12 hours   Daily CMP to reassess kidney function   Monitor Urinary output   Renally dose medications   Avoid nephrotoxic medications

## 2020-02-01 NOTE — PLAN OF CARE
VN Rounds. VN called into patient's room for rounding and turned camera with permission. Patient resting in bed. Attempted to go over discharge instructions but patient stated that the nurse was going to come later to give them to her. She declined need for VN help. VN instructed to call for assistance. VN informed bedside nurse. Call light within reach. Patient verbalized understanding. No acute distress noted. Pain denies pain at present. Allowed time for questions. Will continue to monitor chart and be available and intervene as needed.

## 2020-02-01 NOTE — PLAN OF CARE
Discharge orders noted, no HH or HME ordered.    Future Appointments   Date Time Provider Department Center   2/10/2020  8:40 AM Lilia Woodard MD [facility]       Pt's nurse will go over medications/signs and symptoms prior to discharge       02/01/20 1346   Final Note   Assessment Type Final Discharge Note   Anticipated Discharge Disposition Home   What phone number can be called within the next 1-3 days to see how you are doing after discharge? [phone]   Hospital Follow Up  Appt(s) scheduled? Yes   Right Care Referral Info   Post Acute Recommendation No Care     Lorena Muñoz, RN Transitional Navigator  [phone]

## 2020-02-01 NOTE — NURSING
Discharge instructions given. IV removed with catheter tip intact. No distress noted. Personal items with family. Patient discharged to the front of the hospital with transport and items in tow/   Subjective





- Date & Time of Evaluation


Date of Evaluation: 02/19/18


Time of Evaluation: 10:00





- Subjective


Subjective: 





Neuro progress note: 





Pt was seen and examined at the bedside.  No acute events overnight. He is AAO 

x 3. He is able to follow simple commands. Pt denies any headache, blurred 

vision, diplopia, dizziness, lightheadedness, nausea, or vomiting.  





12 Point ROS performed and neg other than stated above. 





Objective





- Vital Signs/Intake and Output


Vital Signs (last 24 hours): 


 











Temp Pulse Resp BP Pulse Ox


 


 97.5 F L  80   20   116/62   98 


 


 02/19/18 06:00  02/19/18 09:59  02/19/18 06:00  02/19/18 09:59  02/19/18 06:00








Intake and Output: 


 











 02/19/18 02/19/18





 06:59 18:59


 


Intake Total 770 


 


Output Total 1000 


 


Balance -230 














- Medications


Medications: 


 Current Medications





Acetaminophen (Tylenol 325mg Tab)  650 mg PO Q6H PRN


   PRN Reason: Fever >100.4 F


Allopurinol (Zyloprim)  300 mg PO DAILY [facility]


   Last Admin: 02/19/18 09:59 Dose:  300 mg


Apixaban (Eliquis)  5 mg PO BID [facility]


   PRN Reason: Protocol


   Last Admin: 02/19/18 09:58 Dose:  5 mg


Atorvastatin Calcium (Lipitor)  40 mg PO DIN [facility]


   Last Admin: 02/18/18 17:30 Dose:  40 mg


Fenofibrate (Tricor)  145 mg PO DAILY [facility]


   Last Admin: 02/19/18 09:59 Dose:  145 mg


Furosemide (Lasix)  40 mg PO DAILY [facility]


   Last Admin: 02/19/18 09:59 Dose:  40 mg


Sodium Chloride (Sodium Chloride 0.9%)  1,000 mls @ 50 mls/hr IV .Q20H [facility] hospital


   Last Admin: 02/19/18 01:34 Dose:  50 mls/hr


Lisinopril (Zestril)  20 mg PO BID [facility]


   Last Admin: 02/19/18 09:59 Dose:  20 mg


Nitroglycerin (Nitro-Bid 2% Oint)  1 ea TOP Q4H PRN


   PRN Reason: accelerated hypertension


Ondansetron HCl (Zofran Inj)  4 mg IVP Q6H PRN


   PRN Reason: Nausea/Vomiting


Spironolactone (Aldactone)  12.5 mg PO DAILY [facility]


   Last Admin: 02/19/18 09:59 Dose:  12.5 mg











- Labs


Labs: 


 





 02/17/18 10:40 





 02/18/18 05:40 





 











PT  12.3 SECONDS (9.4-12.5)   02/16/18  19:49    


 


INR  1.08  (0.93-1.08)   02/16/18  19:49    


 


APTT  32.9 Seconds (25.1-36.5)   02/16/18  19:49    














- Constitutional


Appears: No Acute Distress





- Eye Exam


Eye Exam: EOMI, PERRL





- Neurological Exam


Neurological Exam: Alert, Awake, Oriented x3


Neuro motor strength exam: Left Upper Extremity: 5, Right Upper Extremity: 5, 

Left Lower Extremity: 5, Right Lower Extremity: 5


Additional comments: 





Sensation is intact.





Assessment and Plan





- Assessment and Plan (Free Text)


Assessment: 





75-year-old man with a past medical history of hypertension, prostate CA, and A-

fib on Pradaxa, who presented to the ED after an episode of slurred speech, 

dizziness and near-syncope 2/2 small acute/ subacute R vermian infarct as per 

MRI 





- CTA head and neck ordered 


- Pt currently on Eliquis 


- Cont Statin 


- F/u echo report 


- F/u cardiology recs 


- HOB elevation at 45 degrees


- Maintain strict blood pressure with systolics <160 


- Control serum glucose to maintain euglycemia blood sugars 140-180


- Neuro-checks


- PT and OT 





Case and plan was reviewed and discussed with Dr Mcclain.

## 2020-02-01 NOTE — SUBJECTIVE & OBJECTIVE
Interval History: Patient seen and examined this morning. No acute events overnight. Patient reports that she is feeling much improved from yesterday. Reports that she still has pain but it is nowhere near as bad as it was previously. Patient denied any other complaints at this time.     Review of Systems   Constitutional: Negative for appetite change, chills, fatigue, fever and unexpected weight change.   HENT: Negative for rhinorrhea, sinus pressure, sinus pain, sneezing and sore throat.    Eyes: Negative for pain, discharge and itching.   Respiratory: Negative for cough, chest tightness, shortness of breath and wheezing.    Cardiovascular: Negative for chest pain and palpitations.   Gastrointestinal: Positive for abdominal distention (improving) and abdominal pain (improving, LLQ). Negative for blood in stool, constipation, diarrhea, nausea and vomiting.   Genitourinary: Positive for dysuria (improving), frequency and urgency. Negative for difficulty urinating, flank pain and hematuria.   Musculoskeletal: Negative for arthralgias, back pain and myalgias.   Skin: Negative for color change and rash.   Neurological: Negative for dizziness, weakness, light-headedness, numbness and headaches.   Psychiatric/Behavioral: Negative for behavioral problems, confusion, decreased concentration and sleep disturbance. The patient is not nervous/anxious.      Objective:     Vital Signs (Most Recent):  Temp: 99.3 °F (37.4 °C) (02/01/20 0333)  Pulse: 92 (02/01/20 0448)  Resp: 20 (02/01/20 0333)  BP: (!) 147/67 (02/01/20 0333)  SpO2: 96 % (02/01/20 0415) Vital Signs (24h Range):  Temp:  [98.7 °F (37.1 °C)-99.8 °F (37.7 °C)] 99.3 °F (37.4 °C)  Pulse:  [] 92  Resp:  [18-22] 20  SpO2:  [92 %-96 %] 96 %  BP: (131-176)/(67-78) 147/67     Weight: 63.2 kg (139 lb 5.3 oz)  Body mass index is 26.33 kg/m².    Intake/Output Summary (Last 24 hours) at 2/1/2020 0734  Last data filed at 2/1/2020 0600  Gross per 24 hour   Intake 890 ml    Output 3300 ml   Net -2410 ml      Physical Exam   Constitutional: She is oriented to person, place, and time. She appears well-developed and well-nourished. No distress.   HENT:   Head: Normocephalic and atraumatic.   Right Ear: External ear normal.   Left Ear: External ear normal.   Nose: Nose normal.   Mouth/Throat: Oropharynx is clear and moist. No oropharyngeal exudate.   Eyes: Pupils are equal, round, and reactive to light. Conjunctivae and EOM are normal.   Neck: Normal range of motion. Neck supple. No JVD present.   Cardiovascular: Normal rate, regular rhythm, normal heart sounds and intact distal pulses.   No murmur heard.  Pulmonary/Chest: Effort normal and breath sounds normal. No respiratory distress. She has no wheezes.   Abdominal: Soft. Bowel sounds are normal. She exhibits no distension and no mass. There is tenderness (LUQ and LLQ).   Musculoskeletal: Normal range of motion. She exhibits no edema.   Neurological: She is alert and oriented to person, place, and time.   Skin: Skin is warm and dry. Capillary refill takes less than 2 seconds. She is not diaphoretic.   Psychiatric: She has a normal mood and affect. Her behavior is normal.       Significant Labs:   CBC:   Recent Labs   Lab 01/30/20  1623 01/31/20  0622 02/01/20  0436   WBC 27.80* 25.78* 20.37*   HGB 9.6* 9.0* 8.9*   HCT 28.4* 27.6* 27.3*    314 364*     CMP:   Recent Labs   Lab 01/30/20  1623 01/31/20  0622 02/01/20  0436   * 137 138   K 4.2 3.8 3.6    108 108   CO2 23 21* 21*   GLU 82 88 63*   BUN 29* 29* 27*   CREATININE 1.8* 1.7* 1.3   CALCIUM 9.4 8.8 8.9   PROT 6.6 5.5* 5.5*   ALBUMIN 2.8* 2.1* 2.0*   BILITOT 1.9* 1.6* 1.3*   ALKPHOS 501* 446* 602*   AST 59* 32 27   ALT 27 21 19   ANIONGAP 8 8 9   EGFRNONAA 30* 32* 44*     Urine Culture:   Recent Labs   Lab 01/30/20  1623   LABURIN PRESUMPTIVE E COLI  >100,000 cfu/ml  Identification and susceptibility pending  *       Significant Imaging: I have reviewed and  interpreted all pertinent imaging results/findings within the past 24 hours.

## 2020-02-01 NOTE — NURSING
Patient AAOx4. Complaining of pain and tenderness in abdomen. No respiratory distress noted. Patient able to feed self and ambulate to BR with standby assistance. IV clean dry and intact. Patient educated on pain ,management and the importance of notifying nurse of pain before it becomes severe.  Patient educated to call for assistance before attempting to get out of bed. Bed alarm activated. Call light with in reach. Side rails up x2 and bed in lowest position. Patient encouraged to use Incentive spirometer as instructed. Will continue to monitor patient.

## 2020-02-02 LAB — BACTERIA UR CULT: ABNORMAL

## 2020-02-04 LAB
BACTERIA BLD CULT: NORMAL
BACTERIA BLD CULT: NORMAL

## 2020-02-10 ENCOUNTER — OFFICE VISIT (OUTPATIENT)
Dept: FAMILY MEDICINE | Facility: HOSPITAL | Age: 65
End: 2020-02-10
Attending: SPECIALIST
Payer: MEDICAID

## 2020-02-10 VITALS
BODY MASS INDEX: 22.6 KG/M2 | DIASTOLIC BLOOD PRESSURE: 74 MMHG | SYSTOLIC BLOOD PRESSURE: 132 MMHG | WEIGHT: 119.69 LBS | HEIGHT: 61 IN

## 2020-02-10 DIAGNOSIS — N30.01 ACUTE CYSTITIS WITH HEMATURIA: Primary | ICD-10-CM

## 2020-02-10 PROCEDURE — 99213 OFFICE O/P EST LOW 20 MIN: CPT | Performed by: STUDENT IN AN ORGANIZED HEALTH CARE EDUCATION/TRAINING PROGRAM

## 2020-02-10 NOTE — PROGRESS NOTES
Subjective:       Patient ID: Shari Carpenter is a 64 y.o. female.    Chief Complaint: Follow-up    Patient is a 65yo female who presents to clinic for hospital follow-up.  She was recently admitted for e. Coli UTI and discharged home with course of cipro.  She completed course of antibiotics and reports improvement of symptoms since discharge.      Review of Systems   Constitutional: Negative for appetite change and fever.   HENT: Negative for congestion.    Respiratory: Negative for cough, chest tightness and shortness of breath.    Cardiovascular: Negative for chest pain and palpitations.   Gastrointestinal: Negative for abdominal pain, diarrhea and nausea.   Genitourinary: Negative for dysuria and hematuria.   Neurological: Negative for dizziness, weakness and headaches.       Objective:      Vitals:    02/10/20 0906   BP: 132/74     Physical Exam   Constitutional: She appears well-developed and well-nourished.   HENT:   Head: Normocephalic and atraumatic.   Eyes: Pupils are equal, round, and reactive to light.   Cardiovascular: Normal rate, regular rhythm and normal heart sounds.   No murmur heard.  Pulmonary/Chest: Effort normal. No respiratory distress.   Musculoskeletal: Normal range of motion. She exhibits no edema.   Neurological: She is alert. No cranial nerve deficit. She exhibits normal muscle tone. Coordination normal.   Nursing note and vitals reviewed.      Assessment:       1. Acute cystitis with hematuria        Plan:       Acute cystitis with hematuria  - completed course of cipro  - doing well    Follow up in about 4 weeks (around 3/9/2020).

## 2020-03-20 ENCOUNTER — DOCUMENTATION ONLY (OUTPATIENT)
Dept: REHABILITATION | Facility: HOSPITAL | Age: 65
End: 2020-03-20

## 2020-03-20 NOTE — PROGRESS NOTES
Outpatient Therapy Discharge Summary     Name: Shari Carpenter  Clinic Number: [id number]    Therapy Diagnosis:        Encounter Diagnoses   Name Primary?    Pain in both wrists      Numbness and tingling in both hands        Physician: Conchita Florian PA     Physician Orders: eval and treat  Medical Diagnosis: Bilateral CTS  Surgical Procedure and Date: n/a/ Date of Injury/Onset: ~6 months ago  Evaluation Date: 1/3/2020    Date of Last visit: 1/17/2020  Total Visits Received: 2  Cancelled Visits: 1  No Show Visits: 2    Assessment    Patient did not return to therapy. Unable to formally assess patient's goals.    Goals:   The following goals were discussed with the patient and patient is in agreement with them as to be addressed in the treatment plan.   Long Term Goals (LTGs); to be met by discharge.  LTG #1: Pt will report a pain level of 2 out of 10 with daily tasks, including opening jars and turning keys --not met  LTG #2: Pt will demo improved FOTO score by at least 20 points. --not met  LTG #3: Pt will return to prior level of function for ADLs and household management. --not met  LTG #4: Pt will demonstrate improved B  strength by at least 3-5# for improved grasp of objects. --not met      Short Term Goals (STGs); to be met within 4 weeks (2/3/2020).  STG #1a: Pt will report 4 out of 10 pain level with ADLs and other daily tasks.--not met   STG #2: Pt will demonstrate independence with issued HEP. --not met    Discharge reason: Patient has not attended therapy since 1/17/2020    Plan   This patient is discharged from Occupational Therapy    SAL Garay

## 2020-03-24 ENCOUNTER — DOCUMENTATION ONLY (OUTPATIENT)
Dept: FAMILY MEDICINE | Facility: HOSPITAL | Age: 65
End: 2020-03-24

## 2020-03-24 NOTE — PROGRESS NOTES
Called patient for scheduled phone visit.  Patient reports she will reschedule in a month.  She would like to bring in her medications and go through them with explanations of what each are for.  All questions answered.    Lilia Woodard MD  [facility]

## 2020-03-25 ENCOUNTER — DOCUMENTATION ONLY (OUTPATIENT)
Dept: REHABILITATION | Facility: HOSPITAL | Age: 65
End: 2020-03-25

## 2020-03-25 PROBLEM — M25.532 PAIN IN BOTH WRISTS: Status: RESOLVED | Noted: 2020-01-03 | Resolved: 2020-03-25

## 2020-03-25 PROBLEM — R20.2 NUMBNESS AND TINGLING IN BOTH HANDS: Status: RESOLVED | Noted: 2020-01-03 | Resolved: 2020-03-25

## 2020-03-25 PROBLEM — M25.531 PAIN IN BOTH WRISTS: Status: RESOLVED | Noted: 2020-01-03 | Resolved: 2020-03-25

## 2020-03-25 PROBLEM — R20.0 NUMBNESS AND TINGLING IN BOTH HANDS: Status: RESOLVED | Noted: 2020-01-03 | Resolved: 2020-03-25

## 2020-03-25 NOTE — PROGRESS NOTES
Outpatient Therapy Discharge Summary     Name: Shari Carpenter  Clinic Number: [id number]    Therapy Diagnosis:        Encounter Diagnoses   Name Primary?    Pain in both upper extremities Yes    Chronic prescription opiate use      Weakness        Physician: Lilia Woodard,*     Physician Orders: OT evaluate and dior  Medical Diagnosis: M25.511,G89.29,M25.512 (ICD-10-CM) - Chronic pain of both shoulders  Evaluation Date: 11/25/2019  Insurance Authorization period Expiration: 12/31/2019  Plan of Care Expiration Period: 1/24/2020  Next MD appointment: 12/27/2019     Visit # / Visits Authorized: 1 / 1    Precautions: Standard     Date of Last visit: 11/25/2019  Total Visits Received: 1(eval only)    Assessment    Goals: Long Term Goals to be met by discharge:  1) Independent with HEP  2) Pt will demonstrate (Bilateral) shoulder AROM WNL grossly for [employer] with ADL's  3) Pt will demonstrate (Bilateral) shoulder MMT WNL grossly for [employer] with functional activities  4) Independent and pain free with ADL's and IADL's  5) Patient will be able to achieve less than or equal to 15% on FOTO shoulder survey demonstrating overall improved functional ability with upper extremity.     Discharge reason: Patient has not attended therapy since 11/25/2019    Plan   This patient is discharged from Occupational Therapy

## 2020-06-18 ENCOUNTER — HOSPITAL ENCOUNTER (OUTPATIENT)
Dept: RADIOLOGY | Facility: HOSPITAL | Age: 65
Discharge: HOME | End: 2020-06-18
Attending: NURSE PRACTITIONER
Payer: MEDICAID

## 2020-06-18 DIAGNOSIS — M79.603 ARM PAIN: ICD-10-CM

## 2020-06-18 PROCEDURE — 73090 XR FOREARM BILATERAL: ICD-10-PCS | Mod: 26,50,, | Performed by: RADIOLOGY

## 2020-06-18 PROCEDURE — 73090 X-RAY EXAM OF FOREARM: CPT | Mod: TC,50,FY

## 2020-06-18 PROCEDURE — 73090 X-RAY EXAM OF FOREARM: CPT | Mod: 26,50,, | Performed by: RADIOLOGY

## 2021-10-14 ENCOUNTER — HOSPITAL ENCOUNTER (EMERGENCY)
Facility: HOSPITAL | Age: 66
Discharge: HOME | End: 2021-10-14
Attending: EMERGENCY MEDICINE
Payer: MEDICAID

## 2021-10-14 VITALS
TEMPERATURE: 98 F | DIASTOLIC BLOOD PRESSURE: 76 MMHG | HEIGHT: 61 IN | HEART RATE: 80 BPM | OXYGEN SATURATION: 100 % | SYSTOLIC BLOOD PRESSURE: 152 MMHG | RESPIRATION RATE: 18 BRPM | BODY MASS INDEX: 22.62 KG/M2

## 2021-10-14 DIAGNOSIS — I10 HYPERTENSION, UNSPECIFIED TYPE: ICD-10-CM

## 2021-10-14 DIAGNOSIS — R51.9 ACUTE NONINTRACTABLE HEADACHE, UNSPECIFIED HEADACHE TYPE: Primary | ICD-10-CM

## 2021-10-14 PROCEDURE — 99284 EMERGENCY DEPT VISIT MOD MDM: CPT | Mod: 25

## 2021-10-14 PROCEDURE — 63600175 PHARM REV CODE 636 W HCPCS: Performed by: EMERGENCY MEDICINE

## 2021-10-14 PROCEDURE — 96372 THER/PROPH/DIAG INJ SC/IM: CPT

## 2021-10-14 RX ORDER — PROCHLORPERAZINE MALEATE 10 MG
10 TABLET ORAL EVERY 8 HOURS PRN
Qty: 14 TABLET | Refills: 0 | Status: SHIPPED | OUTPATIENT
Start: 2021-10-14

## 2021-10-14 RX ORDER — TIZANIDINE 4 MG/1
4 TABLET ORAL 2 TIMES DAILY PRN
COMMUNITY
Start: 2021-09-23

## 2021-10-14 RX ORDER — AMLODIPINE BESYLATE 10 MG/1
10 TABLET ORAL DAILY
COMMUNITY
Start: 2021-09-16

## 2021-10-14 RX ORDER — SUMATRIPTAN SUCCINATE 100 MG/1
100 TABLET ORAL
COMMUNITY
Start: 2021-10-08

## 2021-10-14 RX ORDER — HYDROCODONE BITARTRATE AND ACETAMINOPHEN 5; 325 MG/1; MG/1
1 TABLET ORAL
COMMUNITY
Start: 2021-09-23

## 2021-10-14 RX ORDER — PROCHLORPERAZINE EDISYLATE 5 MG/ML
10 INJECTION INTRAMUSCULAR; INTRAVENOUS
Status: COMPLETED | OUTPATIENT
Start: 2021-10-14 | End: 2021-10-14

## 2021-10-14 RX ORDER — DEXAMETHASONE SODIUM PHOSPHATE 4 MG/ML
8 INJECTION, SOLUTION INTRA-ARTICULAR; INTRALESIONAL; INTRAMUSCULAR; INTRAVENOUS; SOFT TISSUE
Status: COMPLETED | OUTPATIENT
Start: 2021-10-14 | End: 2021-10-14

## 2021-10-14 RX ORDER — NAPROXEN 500 MG/1
500 TABLET ORAL EVERY 12 HOURS
COMMUNITY
Start: 2021-09-23

## 2021-10-14 RX ADMIN — DEXAMETHASONE SODIUM PHOSPHATE 8 MG: 4 INJECTION, SOLUTION INTRA-ARTICULAR; INTRALESIONAL; INTRAMUSCULAR; INTRAVENOUS; SOFT TISSUE at 03:10

## 2021-10-14 RX ADMIN — PROCHLORPERAZINE EDISYLATE 10 MG: 5 INJECTION INTRAMUSCULAR; INTRAVENOUS at 03:10

## 2023-04-20 ENCOUNTER — HOSPITAL ENCOUNTER (EMERGENCY)
Facility: HOSPITAL | Age: 68
Discharge: HOME | End: 2023-04-21
Attending: STUDENT IN AN ORGANIZED HEALTH CARE EDUCATION/TRAINING PROGRAM
Payer: MEDICAID

## 2023-04-20 DIAGNOSIS — M25.511 CHRONIC RIGHT SHOULDER PAIN: ICD-10-CM

## 2023-04-20 DIAGNOSIS — R07.9 CHEST PAIN: ICD-10-CM

## 2023-04-20 DIAGNOSIS — G89.29 CHRONIC RIGHT SHOULDER PAIN: ICD-10-CM

## 2023-04-20 DIAGNOSIS — G89.29 CHRONIC NONINTRACTABLE HEADACHE, UNSPECIFIED HEADACHE TYPE: Primary | ICD-10-CM

## 2023-04-20 DIAGNOSIS — R51.9 CHRONIC NONINTRACTABLE HEADACHE, UNSPECIFIED HEADACHE TYPE: Primary | ICD-10-CM

## 2023-04-20 LAB
ALBUMIN SERPL BCP-MCNC: 4.2 G/DL (ref 3.5–5.2)
ALP SERPL-CCNC: 93 U/L (ref 55–135)
ALT SERPL W/O P-5'-P-CCNC: 15 U/L (ref 10–44)
ANION GAP SERPL CALC-SCNC: 11 MMOL/L (ref 8–16)
AST SERPL-CCNC: 28 U/L (ref 10–40)
BASOPHILS # BLD AUTO: 0.06 K/UL (ref 0–0.2)
BASOPHILS NFR BLD: 0.7 % (ref 0–1.9)
BILIRUB SERPL-MCNC: <0.1 MG/DL (ref 0.1–1)
BNP SERPL-MCNC: 50 PG/ML (ref 0–99)
BUN SERPL-MCNC: 12 MG/DL (ref 8–23)
CALCIUM SERPL-MCNC: 8.8 MG/DL (ref 8.7–10.5)
CHLORIDE SERPL-SCNC: 109 MMOL/L (ref 95–110)
CO2 SERPL-SCNC: 19 MMOL/L (ref 23–29)
CREAT SERPL-MCNC: 0.7 MG/DL (ref 0.5–1.4)
DIFFERENTIAL METHOD: ABNORMAL
EOSINOPHIL # BLD AUTO: 0.2 K/UL (ref 0–0.5)
EOSINOPHIL NFR BLD: 2.6 % (ref 0–8)
ERYTHROCYTE [DISTWIDTH] IN BLOOD BY AUTOMATED COUNT: 14.1 % (ref 11.5–14.5)
EST. GFR  (NO RACE VARIABLE): >60 ML/MIN/1.73 M^2
GLUCOSE SERPL-MCNC: 104 MG/DL (ref 70–110)
HCT VFR BLD AUTO: 38.8 % (ref 37–48.5)
HGB BLD-MCNC: 12.6 G/DL (ref 12–16)
IMM GRANULOCYTES # BLD AUTO: 0.02 K/UL (ref 0–0.04)
IMM GRANULOCYTES NFR BLD AUTO: 0.2 % (ref 0–0.5)
LIPASE SERPL-CCNC: 16 U/L (ref 4–60)
LYMPHOCYTES # BLD AUTO: 2.5 K/UL (ref 1–4.8)
LYMPHOCYTES NFR BLD: 29.5 % (ref 18–48)
MAGNESIUM SERPL-MCNC: 2.1 MG/DL (ref 1.6–2.6)
MCH RBC QN AUTO: 25.5 PG (ref 27–31)
MCHC RBC AUTO-ENTMCNC: 32.5 G/DL (ref 32–36)
MCV RBC AUTO: 78 FL (ref 82–98)
MONOCYTES # BLD AUTO: 0.6 K/UL (ref 0.3–1)
MONOCYTES NFR BLD: 7.5 % (ref 4–15)
NEUTROPHILS # BLD AUTO: 5 K/UL (ref 1.8–7.7)
NEUTROPHILS NFR BLD: 59.5 % (ref 38–73)
NRBC BLD-RTO: 0 /100 WBC
PLATELET # BLD AUTO: 271 K/UL (ref 150–450)
PMV BLD AUTO: 11.5 FL (ref 9.2–12.9)
POTASSIUM SERPL-SCNC: 4.9 MMOL/L (ref 3.5–5.1)
PROT SERPL-MCNC: 7.4 G/DL (ref 6–8.4)
RBC # BLD AUTO: 4.95 M/UL (ref 4–5.4)
SODIUM SERPL-SCNC: 139 MMOL/L (ref 136–145)
TROPONIN I SERPL DL<=0.01 NG/ML-MCNC: 0.01 NG/ML (ref 0–0.03)
WBC # BLD AUTO: 8.35 K/UL (ref 3.9–12.7)

## 2023-04-20 PROCEDURE — 93010 EKG 12-LEAD: ICD-10-PCS | Mod: ,,, | Performed by: INTERNAL MEDICINE

## 2023-04-20 PROCEDURE — 83690 ASSAY OF LIPASE: CPT | Performed by: STUDENT IN AN ORGANIZED HEALTH CARE EDUCATION/TRAINING PROGRAM

## 2023-04-20 PROCEDURE — 93010 ELECTROCARDIOGRAM REPORT: CPT | Mod: ,,, | Performed by: INTERNAL MEDICINE

## 2023-04-20 PROCEDURE — 93005 ELECTROCARDIOGRAM TRACING: CPT

## 2023-04-20 PROCEDURE — 81000 URINALYSIS NONAUTO W/SCOPE: CPT | Performed by: STUDENT IN AN ORGANIZED HEALTH CARE EDUCATION/TRAINING PROGRAM

## 2023-04-20 PROCEDURE — 80053 COMPREHEN METABOLIC PANEL: CPT | Performed by: STUDENT IN AN ORGANIZED HEALTH CARE EDUCATION/TRAINING PROGRAM

## 2023-04-20 PROCEDURE — 96361 HYDRATE IV INFUSION ADD-ON: CPT

## 2023-04-20 PROCEDURE — 99285 EMERGENCY DEPT VISIT HI MDM: CPT | Mod: 25

## 2023-04-20 PROCEDURE — 96374 THER/PROPH/DIAG INJ IV PUSH: CPT

## 2023-04-20 PROCEDURE — 83735 ASSAY OF MAGNESIUM: CPT | Performed by: STUDENT IN AN ORGANIZED HEALTH CARE EDUCATION/TRAINING PROGRAM

## 2023-04-20 PROCEDURE — 85025 COMPLETE CBC W/AUTO DIFF WBC: CPT | Performed by: STUDENT IN AN ORGANIZED HEALTH CARE EDUCATION/TRAINING PROGRAM

## 2023-04-20 PROCEDURE — 25000003 PHARM REV CODE 250: Performed by: STUDENT IN AN ORGANIZED HEALTH CARE EDUCATION/TRAINING PROGRAM

## 2023-04-20 PROCEDURE — 63600175 PHARM REV CODE 636 W HCPCS: Performed by: STUDENT IN AN ORGANIZED HEALTH CARE EDUCATION/TRAINING PROGRAM

## 2023-04-20 PROCEDURE — 83880 ASSAY OF NATRIURETIC PEPTIDE: CPT | Performed by: STUDENT IN AN ORGANIZED HEALTH CARE EDUCATION/TRAINING PROGRAM

## 2023-04-20 PROCEDURE — 84484 ASSAY OF TROPONIN QUANT: CPT | Performed by: STUDENT IN AN ORGANIZED HEALTH CARE EDUCATION/TRAINING PROGRAM

## 2023-04-20 RX ORDER — ACETAMINOPHEN 500 MG
500 TABLET ORAL
Status: COMPLETED | OUTPATIENT
Start: 2023-04-20 | End: 2023-04-20

## 2023-04-20 RX ORDER — PROCHLORPERAZINE EDISYLATE 5 MG/ML
10 INJECTION INTRAMUSCULAR; INTRAVENOUS
Status: COMPLETED | OUTPATIENT
Start: 2023-04-20 | End: 2023-04-20

## 2023-04-20 RX ORDER — DIPHENHYDRAMINE HCL 25 MG
25 CAPSULE ORAL
Status: COMPLETED | OUTPATIENT
Start: 2023-04-20 | End: 2023-04-20

## 2023-04-20 RX ADMIN — ACETAMINOPHEN 500 MG: 500 TABLET ORAL at 11:04

## 2023-04-20 RX ADMIN — SODIUM CHLORIDE 1000 ML: 0.9 INJECTION, SOLUTION INTRAVENOUS at 10:04

## 2023-04-20 RX ADMIN — PROCHLORPERAZINE EDISYLATE 10 MG: 5 INJECTION INTRAMUSCULAR; INTRAVENOUS at 10:04

## 2023-04-20 RX ADMIN — DIPHENHYDRAMINE HYDROCHLORIDE 25 MG: 25 CAPSULE ORAL at 11:04

## 2023-04-21 VITALS
HEART RATE: 66 BPM | RESPIRATION RATE: 20 BRPM | BODY MASS INDEX: 27.44 KG/M2 | DIASTOLIC BLOOD PRESSURE: 69 MMHG | OXYGEN SATURATION: 98 % | TEMPERATURE: 98 F | HEIGHT: 56 IN | WEIGHT: 122 LBS | SYSTOLIC BLOOD PRESSURE: 140 MMHG

## 2023-04-21 LAB
BILIRUB UR QL STRIP: NEGATIVE
CLARITY UR: CLEAR
COLOR UR: COLORLESS
GLUCOSE UR QL STRIP: NEGATIVE
HGB UR QL STRIP: NEGATIVE
KETONES UR QL STRIP: NEGATIVE
LEUKOCYTE ESTERASE UR QL STRIP: ABNORMAL
MICROSCOPIC COMMENT: NORMAL
NITRITE UR QL STRIP: NEGATIVE
PH UR STRIP: 6 [PH] (ref 5–8)
PROT UR QL STRIP: NEGATIVE
SP GR UR STRIP: 1.02 (ref 1–1.03)
URN SPEC COLLECT METH UR: ABNORMAL
UROBILINOGEN UR STRIP-ACNC: NEGATIVE EU/DL
WBC #/AREA URNS HPF: 0 /HPF (ref 0–5)

## 2023-04-21 PROCEDURE — 96375 TX/PRO/DX INJ NEW DRUG ADDON: CPT

## 2023-04-21 PROCEDURE — 96361 HYDRATE IV INFUSION ADD-ON: CPT

## 2023-04-21 PROCEDURE — 63600175 PHARM REV CODE 636 W HCPCS: Performed by: STUDENT IN AN ORGANIZED HEALTH CARE EDUCATION/TRAINING PROGRAM

## 2023-04-21 RX ORDER — DEXAMETHASONE SODIUM PHOSPHATE 4 MG/ML
12 INJECTION, SOLUTION INTRA-ARTICULAR; INTRALESIONAL; INTRAMUSCULAR; INTRAVENOUS; SOFT TISSUE
Status: COMPLETED | OUTPATIENT
Start: 2023-04-21 | End: 2023-04-21

## 2023-04-21 RX ORDER — BUTALBITAL, ACETAMINOPHEN AND CAFFEINE 50; 325; 40 MG/1; MG/1; MG/1
1 TABLET ORAL EVERY 4 HOURS PRN
Qty: 30 TABLET | Refills: 0 | Status: SHIPPED | OUTPATIENT
Start: 2023-04-21

## 2023-04-21 RX ADMIN — DEXAMETHASONE SODIUM PHOSPHATE 12 MG: 4 INJECTION, SOLUTION INTRA-ARTICULAR; INTRALESIONAL; INTRAMUSCULAR; INTRAVENOUS; SOFT TISSUE at 01:04

## 2023-04-21 NOTE — ED PROVIDER NOTES
Encounter Date: 4/20/2023       History     Chief Complaint   Patient presents with    Headache    Shoulder Pain     Presents via  EMS with c/o HA and R shoulder pain. Rates HA 7/10 reports taking Immtrex 2 hours ago without relief. Also c/o R shoulder pain that patient attributes to repetitive motion on her job as a . Reports hx of seizure disorder. Denies recent sz activity. Denies taking any anit-seizure medication      67-year-old female with past medical history of migraine  seizure disorder and arthritis presents with right shoulder pain.  Patient reports having similar headaches past to her migraines.  She reports at its onset the pain was rated 7/10 took Imitrex 2 hours prior to arrival.  Patient reports symptoms have improved headache is currently 4/10.  She reports feeling mildly nauseous but no episodes of emesis.  Denies any visual disturbances, weakness numbness or tingling.  She endorses chronic.  She states she had left rotator cuff repair right-sided arthritis.  She reports lifting heavy objects as a  which exacerbated her shoulder pain.  Denies any injury to the area.    She endorses having intermittent chest pain for the past 2 weeks.  Last 1 occurring 3 days ago.  Denies any pain time.  Denies any shortness breath.  Denies any cough swelling or hemoptysis.  Denies any fevers or chills.    Furthermore she reports having chronic abdominal pain in nature he reports a feels like her abdomen is distended his last bowel movement was yesterday and regular.  Denies any melena or hematochezia.  Denies any history of abdominal surgeries.  Denies any dysuria or hematuria but states she feels like her bladder is full all the time.      Review of patient's allergies indicates:  No Known Allergies  Past Medical History:   Diagnosis Date    Arthritis     Migraine headache     Osteoporosis     Seizures      Past Surgical History:   Procedure Laterality Date    SHOULDER SURGERY Right       No family history on file.  Social History     Tobacco Use    Smoking status: Never    Smokeless tobacco: Never   Substance Use Topics    Alcohol use: No     Review of Systems   Constitutional:  Negative for chills and fever.   HENT:  Negative for congestion and rhinorrhea.    Eyes:  Negative for pain.   Respiratory:  Negative for cough and shortness of breath.    Cardiovascular:  Positive for chest pain. Negative for leg swelling.   Gastrointestinal:  Positive for abdominal pain and nausea. Negative for vomiting.   Endocrine: Negative for polyuria.   Genitourinary:  Negative for dysuria and hematuria.   Musculoskeletal:  Negative for gait problem and neck pain.   Skin:  Negative for rash.   Allergic/Immunologic: Negative for immunocompromised state.   Neurological:  Positive for headaches. Negative for weakness.     Physical Exam     Initial Vitals [04/20/23 2054]   BP Pulse Resp Temp SpO2   136/66 96 16 98.2 °F (36.8 °C) 98 %      MAP       --         Physical Exam    Nursing note and vitals reviewed.  Constitutional: She appears well-developed and well-nourished. She is not diaphoretic. No distress.   HENT:   Head: Normocephalic and atraumatic.   Eyes: Conjunctivae and EOM are normal. Pupils are equal, round, and reactive to light.   Neck:   Normal range of motion.  Cardiovascular:  Regular rhythm.           Pulmonary/Chest: Breath sounds normal. No respiratory distress.   Abdominal: Abdomen is soft. Bowel sounds are normal. She exhibits no distension. There is no abdominal tenderness. There is no rebound and no guarding.   Musculoskeletal:         General: No tenderness. Normal range of motion.      Cervical back: Normal range of motion.     Lymphadenopathy:     She has no cervical adenopathy.   Neurological: She is alert and oriented to person, place, and time.   Moves all extremities and carries on conversation. CN- II: PERRL; III/IV/VI: EOMI w/out evidence of nystagmus; V: no deficits appreciated to light  touch bilateral face; VII: no facial weakness, no facial asymmetry. Eyebrow raise symmetric. Smile symmetric; IX/X: palate midline, and raises symmetrically; XI: shoulder shrug 5/5 bilaterally; XII: tongue is midline w/out asymmetry. Strength 5/5 to bilateral upper and lower extremities, sensation intact to light touch,       Skin: Skin is warm. Capillary refill takes less than 2 seconds.   Psychiatric: She has a normal mood and affect. Her behavior is normal.       ED Course   Procedures  Labs Reviewed   CBC W/ AUTO DIFFERENTIAL - Abnormal; Notable for the following components:       Result Value    MCV 78 (*)     MCH 25.5 (*)     All other components within normal limits   COMPREHENSIVE METABOLIC PANEL - Abnormal; Notable for the following components:    CO2 19 (*)     Total Bilirubin <0.1 (*)     All other components within normal limits   URINALYSIS, REFLEX TO URINE CULTURE - Abnormal; Notable for the following components:    Color, UA Colorless (*)     Leukocytes, UA Trace (*)     All other components within normal limits    Narrative:     Specimen Source->Urine   TROPONIN I   B-TYPE NATRIURETIC PEPTIDE   LIPASE   MAGNESIUM   URINALYSIS MICROSCOPIC    Narrative:     Specimen Source->Urine     EKG Readings: (Independently Interpreted)   Independent Interpretation of EKG:  Rhythm: Sinus  Rate: 67  QTC: 437  No STEMI     Imaging Results              X-Ray Chest AP Portable (Final result)  Result time 04/21/23 01:02:56      Final result by Abdi Gould DO (04/21/23 01:02:56)                   Impression:      No acute abnormality.      Electronically signed by: Abdi Gould  Date:    04/21/2023  Time:    01:02               Narrative:    EXAMINATION:  XR CHEST AP PORTABLE    CLINICAL HISTORY:  Chest pain, unspecified    TECHNIQUE:  Single frontal view of the chest was performed.    COMPARISON:  01/30/2020.    FINDINGS:  The lungs are well expanded and clear. No focal opacities are seen. The pleural spaces are  clear.    The cardiac silhouette is unremarkable.    The visualized osseous structures are intact.  Partially imaged right proximal humerus hardware noted.                                       Medications   dexAMETHasone injection 12 mg (has no administration in time range)   sodium chloride 0.9% bolus 1,000 mL 1,000 mL (1,000 mLs Intravenous New Bag 4/20/23 2230)   prochlorperazine injection Soln 10 mg (10 mg Intravenous Given 4/20/23 2231)   diphenhydrAMINE capsule 25 mg (25 mg Oral Given 4/20/23 2343)   acetaminophen tablet 500 mg (500 mg Oral Given 4/20/23 2343)     Medical Decision Making:   History:   Old Medical Records: I decided to obtain old medical records.  Initial Assessment:   67-year-old female with past medical history of migraine  seizure disorder and arthritis presents with right shoulder pain.  No acute distress, vitals within normal limits EKG with no STEMI, suspect patient's headache secondary to known history of migraines.  Differential diagnosis includes migraine versus tension type headache. No headache red flags. Neurologic exam without evidence of meningismus, focal neurologic findings. Presentation not consistent with acute intracranial bleed to include SAH (lack of risk factors, headache history). Presentation not consistent with acute CNS infection to include meningitis or brain abscess, Temporal arteritis unlikely, as is acute angle closure glaucoma given history and physical findings. Presentation not consistent with other acute, emergent causes of headache at this time. Plan to treat symptomatically with pain medication. No indication for imaging/LP at this time.Furthermore suspect shoulder pain is due to chronic arthritis currently no need imaging of the shoulder.  Will give migraine cocktail, fluids and obtain labs.  Chest pain seems noncardiac in nature but will obtain troponin to rule out NSTEMI and CXR to assess for any intrathoracic abnormalities.   Clinical Tests:   Lab Tests:  Ordered and Reviewed  Radiological Study: Ordered and Reviewed  Medical Tests: Ordered and Reviewed           ED Course as of 04/21/23 0128   Thu Apr 20, 2023   2305 CBC without significant leukocytosis, anemia, or platelet abnormalities.  Chem 14 negative for hypo-or hyper natremia, kalemia, chloridemia, or other electrolyte abnormalities; BUN and creatinine were within normal limits indicating normal kidney function, ALT and AST were within normal limits indicating normal liver function.  Troponin within normal limits. [AS]   Fri Apr 21, 2023   0110 Chest x-ray without any acute intrathoracic abnormalities, UA without signs of infection. The patient was reassessed and on subsequent re-evaluation, they were subjectively feeling better. They were resting comfortably and in no acute distress. I discussed the laboratory and diagnostic findings with the patient. Pt is currently stable for discharge. I see no indication of an emergent process beyond that addressed during our encounter but have duly counseled the patient/family regarding the need for prompt follow-up as well as the indications that should prompt immediate return to the emergency room should new or worrisome developments occur. The patient/family has been provided with verbal and printed direction regarding our final diagnosis(es) as well as instructions regarding use of OTC and/or Rx medications intended to manage the patient's aforementioned conditions. The patient/family verbalized an understanding. The patient/family is asked if there are any questions or concerns. We discuss the case, until all issues are addressed to the patient/family's satisfaction. Patient/family understands and is agreeable to the plan.   [AS]      ED Course User Index  [AS] Stephanie Jose MD          DISCLAIMER: This note was prepared with [employer] voice recognition transcription software. Garbled syntax, mangled pronouns, and other bizarre constructions may be attributed to  that software system.        Clinical Impression:   Final diagnoses:  [R07.9] Chest pain  [R51.9, G89.29] Chronic nonintractable headache, unspecified headache type (Primary)  [M25.511, G89.29] Chronic right shoulder pain        ED Disposition Condition    Discharge Stable          ED Prescriptions       Medication Sig Dispense Start Date End Date Auth. Provider    butalbital-acetaminophen-caffeine -40 mg (FIORICET, ESGIC) -40 mg per tablet Take 1 tablet by mouth every 4 (four) hours as needed for Pain. 30 tablet 4/21/2023 -- Stephanie Jose MD          Follow-up Information       Follow up With Specialties Details Why Contact Info    Lilia Woodard MD Family Medicine Call in 1 day for reassesment [address] [place] [id number]  [phone]      [place] - Emergency Dept Emergency Medicine  If symptoms worsen [address] [facility] [address] [phone]             Stephanie Jose MD  04/21/23 9108

## 2023-04-21 NOTE — ED NOTES
Dr. Stewart notified of pt dry heaving. Will hold PO meds until nausea subsides. Pt in nadn. Ccm/bp/o2 monitoring in place. Call light within reach

## 2023-04-21 NOTE — DISCHARGE INSTRUCTIONS
Thank you for coming to our Emergency Department today. It is important to remember that some problems are difficult to diagnose and may not be found during your first visit. Be sure to follow up with your primary care doctor and review any labs/imaging that was performed with them. If you do not have a primary care doctor, you may contact the one listed on your discharge paperwork or you may also call the [facility] Appointment Desk at [phone] to schedule an appointment with one.     All medications may potentially have side effects and it is impossible to predict which medications may give you side effects. If you feel that you are having a negative effect of any medication you should immediately stop taking them and seek medical attention.    Return to the ER with any questions/concerns, new/concerning symptoms, worsening or failure to improve. Do not drive or make any important decisions for 24 hours if you have received any pain medications, sedatives or mood altering drugs during your ER visit.

## 2023-04-21 NOTE — ED NOTES
Pt aaox4. Brought in by ems from home c/o headache and right shoulder pain x approx one week. Pt also reports intermittent SOB, dizziness and blurry vision that started today. Pt states that she has hx of migraine headache and takes Imitrex. Pt states that her current symptoms feel like her normal migraine headaches. Pt states that she is currently feeling a little better, but not back to her normal self. Pt states that the shoulder pain she is having is chronic and has been having it since having surgery on her shoulder years ago. Pt also reports taking Imitrex 2 hours ago for her headache. Pt denies cp/sob/fever/chills.

## 2024-01-21 ENCOUNTER — OFFICE VISIT (OUTPATIENT)
Dept: URGENT CARE | Facility: CLINIC | Age: 69
End: 2024-01-21
Payer: MEDICARE

## 2024-01-21 VITALS
RESPIRATION RATE: 18 BRPM | HEART RATE: 107 BPM | BODY MASS INDEX: 20.42 KG/M2 | OXYGEN SATURATION: 96 % | WEIGHT: 104 LBS | HEIGHT: 60 IN | TEMPERATURE: 99 F | SYSTOLIC BLOOD PRESSURE: 125 MMHG | DIASTOLIC BLOOD PRESSURE: 81 MMHG

## 2024-01-21 DIAGNOSIS — J06.9 UPPER RESPIRATORY TRACT INFECTION, UNSPECIFIED TYPE: Primary | ICD-10-CM

## 2024-01-21 DIAGNOSIS — J04.0 LARYNGITIS: ICD-10-CM

## 2024-01-21 DIAGNOSIS — J02.9 SORE THROAT: ICD-10-CM

## 2024-01-21 LAB
CTP QC/QA: YES
CTP QC/QA: YES
MOLECULAR STREP A: NEGATIVE
POC MOLECULAR INFLUENZA A AGN: NEGATIVE
POC MOLECULAR INFLUENZA B AGN: NEGATIVE

## 2024-01-21 PROCEDURE — 87502 INFLUENZA DNA AMP PROBE: CPT | Mod: QW,S$GLB,, | Performed by: PHYSICIAN ASSISTANT

## 2024-01-21 PROCEDURE — 99203 OFFICE O/P NEW LOW 30 MIN: CPT | Mod: S$GLB,,, | Performed by: PHYSICIAN ASSISTANT

## 2024-01-21 PROCEDURE — 87651 STREP A DNA AMP PROBE: CPT | Mod: QW,S$GLB,, | Performed by: PHYSICIAN ASSISTANT

## 2024-01-21 RX ORDER — PREDNISONE 20 MG/1
20 TABLET ORAL DAILY
Qty: 5 TABLET | Refills: 0 | Status: SHIPPED | OUTPATIENT
Start: 2024-01-21 | End: 2024-01-26

## 2024-01-21 RX ORDER — PROMETHAZINE HYDROCHLORIDE AND DEXTROMETHORPHAN HYDROBROMIDE 6.25; 15 MG/5ML; MG/5ML
5 SYRUP ORAL EVERY 4 HOURS PRN
Qty: 240 ML | Refills: 0 | Status: SHIPPED | OUTPATIENT
Start: 2024-01-21 | End: 2024-01-31

## 2024-01-21 RX ORDER — AZELASTINE 1 MG/ML
1 SPRAY, METERED NASAL 2 TIMES DAILY
Qty: 30 ML | Refills: 0 | Status: SHIPPED | OUTPATIENT
Start: 2024-01-21 | End: 2025-01-20

## 2024-01-21 NOTE — PROGRESS NOTES
Subjective:      Patient ID: Shari Carpenter is a 68 y.o. female.    Vitals:  height is 5' (1.524 m) and weight is 47.2 kg (104 lb). Her oral temperature is 98.6 °F (37 °C). Her blood pressure is 125/81 and her pulse is 107. Her respiration is 18 and oxygen saturation is 96%.     Chief Complaint: Sore Throat    Pt presents today with c/o sore throat x's 1 day. Pt has taken otc meds for symptoms with no relief. Pt has no known exposures. Pain level 6/10.    Sore Throat   This is a new problem. The current episode started yesterday. The problem has been unchanged. Neither side of throat is experiencing more pain than the other. There has been no fever. The pain is at a severity of 6/10. The pain is moderate. Associated symptoms include congestion, coughing, a hoarse voice, stridor and trouble swallowing. Pertinent negatives include no abdominal pain, diarrhea, drooling, ear discharge, ear pain, headaches, plugged ear sensation, neck pain, shortness of breath, swollen glands or vomiting. She has had no exposure to strep or mono. The treatment provided no relief.       Constitution: Negative for chills, sweating, fatigue and fever.   HENT:  Positive for congestion, postnasal drip, sore throat and trouble swallowing. Negative for ear pain, ear discharge, drooling and voice change.    Neck: Negative for neck pain, neck stiffness, painful lymph nodes and neck swelling.   Cardiovascular:  Negative for chest pain, leg swelling, palpitations, sob on exertion and passing out.   Eyes:  Negative for eye pain, eye redness, photophobia, double vision, blurred vision and eyelid swelling.   Respiratory:  Positive for cough and stridor. Negative for chest tightness, sputum production, bloody sputum, shortness of breath and wheezing.    Gastrointestinal:  Negative for abdominal pain, abdominal bloating, nausea, vomiting, constipation, diarrhea and heartburn.   Musculoskeletal:  Negative for joint pain, joint swelling, abnormal ROM of  joint, back pain, muscle cramps and muscle ache.   Skin:  Negative for rash and hives.   Allergic/Immunologic: Negative for seasonal allergies, food allergies, hives, itching and sneezing.   Neurological:  Negative for dizziness, light-headedness, passing out, loss of balance, headaches, altered mental status, loss of consciousness and seizures.   Hematologic/Lymphatic: Negative for swollen lymph nodes.   Psychiatric/Behavioral:  Negative for altered mental status and nervous/anxious. The patient is not nervous/anxious.       Objective:     Physical Exam   Constitutional: She is oriented to person, place, and time. She appears well-developed. She is cooperative.  Non-toxic appearance. She does not appear ill. No distress.   HENT:   Head: Normocephalic and atraumatic.   Ears:   Right Ear: Hearing, tympanic membrane, external ear and ear canal normal.   Left Ear: Hearing, tympanic membrane, external ear and ear canal normal.   Nose: Mucosal edema and rhinorrhea present. No nasal deformity. No epistaxis. Right sinus exhibits no maxillary sinus tenderness and no frontal sinus tenderness. Left sinus exhibits no maxillary sinus tenderness and no frontal sinus tenderness.   Mouth/Throat: Uvula is midline and mucous membranes are normal. No trismus in the jaw. Normal dentition. No uvula swelling. Posterior oropharyngeal erythema and cobblestoning present. No oropharyngeal exudate, posterior oropharyngeal edema or tonsillar abscesses. No tonsillar exudate.   Eyes: Conjunctivae and lids are normal. No scleral icterus.   Neck: Trachea normal and phonation normal. Neck supple. No edema present. No erythema present. No neck rigidity present.   Cardiovascular: Normal rate, regular rhythm, normal heart sounds and normal pulses.   Pulmonary/Chest: Effort normal and breath sounds normal. No accessory muscle usage or stridor. No respiratory distress. She has no decreased breath sounds. She has no wheezes. She has no rhonchi. She has  no rales.   Abdominal: Normal appearance.   Musculoskeletal: Normal range of motion.         General: No deformity or edema. Normal range of motion.   Lymphadenopathy:     She has no cervical adenopathy.   Neurological: She is alert and oriented to person, place, and time. She exhibits normal muscle tone. Coordination normal.   Skin: Skin is warm, dry, intact, not diaphoretic, not pale and no rash. Capillary refill takes less than 2 seconds.   Psychiatric: Her speech is normal and behavior is normal. Judgment and thought content normal.   Nursing note and vitals reviewed.    Results for orders placed or performed in visit on 01/21/24   POCT Strep A, Molecular   Result Value Ref Range    Molecular Strep A, POC Negative Negative     Acceptable Yes    POCT Influenza A/B MOLECULAR   Result Value Ref Range    POC Molecular Influenza A Ag Negative Negative, Not Reported    POC Molecular Influenza B Ag Negative Negative, Not Reported     Acceptable Yes        Assessment:     1. Upper respiratory tract infection, unspecified type    2. Sore throat    3. Laryngitis        Plan:       Upper respiratory tract infection, unspecified type    Sore throat  -     POCT Strep A, Molecular  -     POCT Influenza A/B MOLECULAR    Laryngitis    Other orders  -     predniSONE (DELTASONE) 20 MG tablet; Take 1 tablet (20 mg total) by mouth once daily. for 5 days  Dispense: 5 tablet; Refill: 0  -     promethazine-dextromethorphan (PROMETHAZINE-DM) 6.25-15 mg/5 mL Syrp; Take 5 mLs by mouth every 4 (four) hours as needed (cough).  Dispense: 240 mL; Refill: 0  -     azelastine (ASTELIN) 137 mcg (0.1 %) nasal spray; 1 spray (137 mcg total) by Nasal route 2 (two) times daily.  Dispense: 30 mL; Refill: 0        Patient Instructions   OVER THE COUNTER RECOMMENDATIONS/SUGGESTIONS.     Make sure to stay well hydrated.     Use Nasal Saline to mechanically move any post nasal drip from your eustachian tube or from the back  of your throat.     Use warm salt water gargles to ease your throat pain. Warm salt water gargles as needed for sore throat-  1/2 tsp salt to 1 cup warm water, gargle as desired.     Use an antihistamine such as Claritin, Zyrtec or Allegra to dry you out.      Use pseudoephedrine (behind the counter) to decongest. Pseudoephedrine  30 mg up to 240 mg /day. It can raise your blood pressure and give you palpitations.     Use mucinex (guaifenisin) to break up mucous up to 2400mg/day to loosen any mucous.   The mucinex DM pill has a cough suppressant that can be sedating. It can be used at night to stop the tickle at the back of your throat.  You can use Mucinex D (it has guaifenesin and a high dose of pseudoephedrine) in the mornings to help decongest.        Use Flonase 1-2 sprays/nostril per day. It is a local acting steroid nasal spray, if you develop a bloody nose, stop using the medication immediately.     Sometimes Nyquil at night is beneficial to help you get some rest, however it is sedating and it does have an antihistamine, and tylenol.     Honey is a natural cough suppressant that can be used.     Tylenol up to 4,000 mg a day is safe for short periods and can be used for body aches, pain, and fever. However in high doses and prolonged use it can cause liver irritation.     Ibuprofen is a non-steroidal anti-inflammatory that can be used for body aches, pain, and fever.However it can also cause stomach irritation if over used.     INSTRUCTIONS:  - Rest.  - Drink plenty of fluids.  - Take Tylenol and/or Ibuprofen as directed as needed for fever/pain.  Do not take more than the recommended dose.  - follow up with your PCP within the next 1-2 weeks as needed.  - You must understand that you have received an Urgent Care treatment only and that you may be released before all of your medical problems are known or treated.   - You, the patient, will arrange for follow up care as instructed.   - If your condition  worsens or fails to improve we recommend that you receive another evaluation at the ER immediately or contact your PCP to discuss your concerns.   - You can call [phone] or [phone] to help schedule an appointment with the appropriate provider.     -If you smoke cigarettes, it would be beneficial for you to stop.

## 2024-01-21 NOTE — PATIENT INSTRUCTIONS
OVER THE COUNTER RECOMMENDATIONS/SUGGESTIONS.     Make sure to stay well hydrated.     Use Nasal Saline to mechanically move any post nasal drip from your eustachian tube or from the back of your throat.     Use warm salt water gargles to ease your throat pain. Warm salt water gargles as needed for sore throat-  1/2 tsp salt to 1 cup warm water, gargle as desired.     Use an antihistamine such as Claritin, Zyrtec or Allegra to dry you out.      Use pseudoephedrine (behind the counter) to decongest. Pseudoephedrine  30 mg up to 240 mg /day. It can raise your blood pressure and give you palpitations.     Use mucinex (guaifenisin) to break up mucous up to 2400mg/day to loosen any mucous.   The mucinex DM pill has a cough suppressant that can be sedating. It can be used at night to stop the tickle at the back of your throat.  You can use Mucinex D (it has guaifenesin and a high dose of pseudoephedrine) in the mornings to help decongest.        Use Flonase 1-2 sprays/nostril per day. It is a local acting steroid nasal spray, if you develop a bloody nose, stop using the medication immediately.     Sometimes Nyquil at night is beneficial to help you get some rest, however it is sedating and it does have an antihistamine, and tylenol.     Honey is a natural cough suppressant that can be used.     Tylenol up to 4,000 mg a day is safe for short periods and can be used for body aches, pain, and fever. However in high doses and prolonged use it can cause liver irritation.     Ibuprofen is a non-steroidal anti-inflammatory that can be used for body aches, pain, and fever.However it can also cause stomach irritation if over used.     INSTRUCTIONS:  - Rest.  - Drink plenty of fluids.  - Take Tylenol and/or Ibuprofen as directed as needed for fever/pain.  Do not take more than the recommended dose.  - follow up with your PCP within the next 1-2 weeks as needed.  - You must understand that you have received an Urgent Care treatment  only and that you may be released before all of your medical problems are known or treated.   - You, the patient, will arrange for follow up care as instructed.   - If your condition worsens or fails to improve we recommend that you receive another evaluation at the ER immediately or contact your PCP to discuss your concerns.   - You can call [phone] or [phone] to help schedule an appointment with the appropriate provider.     -If you smoke cigarettes, it would be beneficial for you to stop.

## 2025-07-28 ENCOUNTER — OFFICE VISIT (OUTPATIENT)
Dept: URGENT CARE | Facility: CLINIC | Age: 70
End: 2025-07-28
Payer: MEDICARE

## 2025-07-28 VITALS
DIASTOLIC BLOOD PRESSURE: 74 MMHG | TEMPERATURE: 98 F | HEART RATE: 86 BPM | SYSTOLIC BLOOD PRESSURE: 112 MMHG | RESPIRATION RATE: 16 BRPM | OXYGEN SATURATION: 97 %

## 2025-07-28 DIAGNOSIS — U07.1 COVID-19 VIRUS INFECTION: Primary | ICD-10-CM

## 2025-07-28 DIAGNOSIS — R11.2 NAUSEA AND VOMITING, UNSPECIFIED VOMITING TYPE: ICD-10-CM

## 2025-07-28 LAB
CTP QC/QA: YES
CTP QC/QA: YES
POC MOLECULAR INFLUENZA A AGN: NEGATIVE
POC MOLECULAR INFLUENZA B AGN: NEGATIVE
SARS-COV+SARS-COV-2 AG RESP QL IA.RAPID: POSITIVE

## 2025-07-28 PROCEDURE — 87502 INFLUENZA DNA AMP PROBE: CPT | Mod: QW,S$GLB,, | Performed by: NURSE PRACTITIONER

## 2025-07-28 PROCEDURE — 99214 OFFICE O/P EST MOD 30 MIN: CPT | Mod: S$GLB,,, | Performed by: NURSE PRACTITIONER

## 2025-07-28 PROCEDURE — 87811 SARS-COV-2 COVID19 W/OPTIC: CPT | Mod: QW,S$GLB,, | Performed by: NURSE PRACTITIONER

## 2025-07-28 RX ORDER — FLUTICASONE PROPIONATE 50 MCG
1 SPRAY, SUSPENSION (ML) NASAL DAILY PRN
Qty: 16 G | Refills: 0 | Status: SHIPPED | OUTPATIENT
Start: 2025-07-28

## 2025-07-28 RX ORDER — BENZONATATE 200 MG/1
200 CAPSULE ORAL 3 TIMES DAILY PRN
Qty: 30 CAPSULE | Refills: 0 | Status: SHIPPED | OUTPATIENT
Start: 2025-07-28

## 2025-07-28 RX ORDER — PROMETHAZINE HYDROCHLORIDE AND DEXTROMETHORPHAN HYDROBROMIDE 6.25; 15 MG/5ML; MG/5ML
5 SYRUP ORAL NIGHTLY PRN
Qty: 118 ML | Refills: 0 | Status: SHIPPED | OUTPATIENT
Start: 2025-07-28

## 2025-07-28 NOTE — PROGRESS NOTES
Subjective:      Patient ID: Shari Carpenter is a 69 y.o. female.    Vitals:  temperature is 98.2 °F (36.8 °C). Her blood pressure is 112/74 and her pulse is 86. Her respiration is 16 and oxygen saturation is 97%.     Chief Complaint: Emesis    Pt presents with vomiting, fatigue, sinus yariel, cough nicole at night  x 4 days    Emesis   This is a new problem. The current episode started in the past 7 days. The problem occurs intermittently. The problem has been gradually worsening. There has been no fever. Associated symptoms include coughing, headaches and myalgias. Pertinent negatives include no arthralgias, chest pain, chills, diarrhea or fever. Treatments tried: ondansetron. The treatment provided no relief.       Constitution: Negative. Negative for chills, sweating, fatigue and fever.   HENT: Negative.  Negative for ear pain, facial swelling, congestion and sore throat.    Neck: Negative for painful lymph nodes.   Cardiovascular: Negative.  Negative for chest trauma, chest pain and sob on exertion.   Eyes: Negative.  Negative for eye itching and eye pain.   Respiratory:  Positive for cough. Negative for chest tightness and asthma.    Gastrointestinal:  Positive for vomiting. Negative for nausea and diarrhea.   Endocrine: negative. cold intolerance and excessive thirst.   Genitourinary: Negative.  Negative for dysuria, frequency, urgency and hematuria.   Musculoskeletal:  Positive for muscle ache. Negative for pain, trauma and joint pain.   Skin: Negative.  Negative for rash, wound and hives.   Allergic/Immunologic: Negative.  Negative for eczema, asthma, hives and itching.   Neurological:  Positive for headaches. Negative for disorientation and altered mental status.   Hematologic/Lymphatic: Negative.  Negative for swollen lymph nodes.   Psychiatric/Behavioral: Negative.  Negative for altered mental status, disorientation and confusion.       Objective:     Physical Exam   Constitutional: She is oriented to person,  place, and time. She appears well-developed. She is cooperative.  Non-toxic appearance. She does not appear ill. No distress.   HENT:   Head: Normocephalic and atraumatic.   Ears:   Right Ear: Hearing normal.   Left Ear: Hearing normal.   Nose: Nose normal. No mucosal edema or nasal deformity. No epistaxis. Right sinus exhibits no maxillary sinus tenderness and no frontal sinus tenderness. Left sinus exhibits no maxillary sinus tenderness and no frontal sinus tenderness.   Mouth/Throat: Uvula is midline, oropharynx is clear and moist and mucous membranes are normal. No trismus in the jaw. Normal dentition. No uvula swelling. No posterior oropharyngeal edema.   Eyes: Conjunctivae and lids are normal. No scleral icterus.   Neck: Trachea normal and phonation normal. Neck supple. No edema present. No erythema present. No neck rigidity present.   Cardiovascular: Normal rate, regular rhythm, normal heart sounds and normal pulses.   Pulmonary/Chest: Effort normal and breath sounds normal. No stridor. No respiratory distress. She has no decreased breath sounds. She has no wheezes. She has no rhonchi. She has no rales. She exhibits no tenderness.   Abdominal: Normal appearance.   Musculoskeletal: Normal range of motion.         General: No deformity. Normal range of motion.   Lymphadenopathy:     She has no cervical adenopathy.   Neurological: no focal deficit. She is alert, oriented to person, place, and time and at baseline. She exhibits normal muscle tone. Coordination normal.   Skin: Skin is warm, dry, intact, not diaphoretic and not pale.   Psychiatric: Her speech is normal and behavior is normal. Mood, judgment and thought content normal.   Nursing note and vitals reviewed.    Results for orders placed or performed in visit on 07/28/25   SARS Coronavirus 2 Antigen, POCT Manual Read    Collection Time: 07/28/25  2:03 PM   Result Value Ref Range    SARS Coronavirus 2 Antigen Positive (A) Negative, Presumptive Negative      Acceptable Yes    POCT Influenza A/B MOLECULAR    Collection Time: 07/28/25  2:18 PM   Result Value Ref Range    POC Molecular Influenza A Ag Negative Negative    POC Molecular Influenza B Ag Negative Negative     Acceptable Yes          Assessment:     1. COVID-19 virus infection    2. Nausea and vomiting, unspecified vomiting type        Plan:   Discussed + Covid test with patient.  Patient declined antiviral tx.    All hx was provided by the pt or available as part of established EMR. The pt past medical hx, family hx, social hx, and current medications were reviewed. Interpretation of diagnostics performed today were discussed. Tx discussed. Quarantine recommendations based on CDC guidelines discussed. Pt may follow up with [facility] for any concern. ER precautions. Pt voiced understanding of all discussed, and agreed.    Some portions of the physical exam were omitted to reduce chances of viral infection transmission.       Discussed with patient that Covid is a viral infection. Discussed the progression and duration of a virus.  Discussed OTC medications to treat for symptoms at home.  Encouraged plenty of fluids and rest.  Discussed vitamin supplementation such as vitamin-C may help lessen duration of symptoms.  Recommended PCP follow-up or return to urgent care for re-evaluation if no improvement.  Patient verbalized understanding of all discussed.       FOLLOWUP  Follow up if symptoms worsen or fail to improve, for PLEASE CONTACT PCP OR CONTACT THE EMERGENCY ROOM..     PATIENT INSTRUCTIONS  Patient Instructions   INSTRUCTIONS:  - Rest.  - Drink plenty of fluids.  - Take Tylenol and/or Ibuprofen as directed as needed for fever/pain.  Do not take more than the recommended dose.  - follow up with your PCP within the next 1-2 weeks as needed.  - You must understand that you have received an Urgent Care treatment only and that you may be released before all of your medical problems are  known or treated.   - You, the patient, will arrange for follow up care as instructed.   - If your condition worsens or fails to improve we recommend that you receive another evaluation at the ER immediately or contact your PCP to discuss your concerns.   - You can call [phone] or [phone] to help schedule an appointment with the appropriate provider.     -If you smoke cigarettes, it would be beneficial for you to stop.         Thank you for allowing me to participate in your health care,     FNP-C       COVID-19 virus infection  -     promethazine-dextromethorphan (PROMETHAZINE-DM) 6.25-15 mg/5 mL Syrp; Take 5 mLs by mouth nightly as needed (cough).  Dispense: 118 mL; Refill: 0  -     benzonatate (TESSALON) 200 MG capsule; Take 1 capsule (200 mg total) by mouth 3 (three) times daily as needed for Cough.  Dispense: 30 capsule; Refill: 0  -     fluticasone propionate (FLONASE) 50 mcg/actuation nasal spray; 1 spray (50 mcg total) by Each Nostril route daily as needed for Rhinitis.  Dispense: 16 g; Refill: 0    Nausea and vomiting, unspecified vomiting type  -     SARS Coronavirus 2 Antigen, POCT Manual Read  -     POCT Influenza A/B MOLECULAR

## 2025-07-28 NOTE — PATIENT INSTRUCTIONS
